# Patient Record
Sex: FEMALE | Race: BLACK OR AFRICAN AMERICAN | NOT HISPANIC OR LATINO | Employment: UNEMPLOYED | URBAN - METROPOLITAN AREA
[De-identification: names, ages, dates, MRNs, and addresses within clinical notes are randomized per-mention and may not be internally consistent; named-entity substitution may affect disease eponyms.]

---

## 2023-10-14 ENCOUNTER — HOSPITAL ENCOUNTER (EMERGENCY)
Facility: HOSPITAL | Age: 33
Discharge: HOME | End: 2023-10-14
Payer: COMMERCIAL

## 2023-10-14 VITALS
WEIGHT: 205 LBS | HEIGHT: 65 IN | DIASTOLIC BLOOD PRESSURE: 82 MMHG | BODY MASS INDEX: 34.16 KG/M2 | TEMPERATURE: 97.5 F | RESPIRATION RATE: 18 BRPM | SYSTOLIC BLOOD PRESSURE: 117 MMHG | HEART RATE: 89 BPM | OXYGEN SATURATION: 99 %

## 2023-10-14 PROCEDURE — 99283 EMERGENCY DEPT VISIT LOW MDM: CPT

## 2023-10-14 PROCEDURE — 4500999001 HC ED NO CHARGE

## 2023-10-14 ASSESSMENT — COLUMBIA-SUICIDE SEVERITY RATING SCALE - C-SSRS
2. HAVE YOU ACTUALLY HAD ANY THOUGHTS OF KILLING YOURSELF?: NO
1. IN THE PAST MONTH, HAVE YOU WISHED YOU WERE DEAD OR WISHED YOU COULD GO TO SLEEP AND NOT WAKE UP?: NO
6. HAVE YOU EVER DONE ANYTHING, STARTED TO DO ANYTHING, OR PREPARED TO DO ANYTHING TO END YOUR LIFE?: NO
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
## 2023-10-14 NOTE — ED NOTES
Pt brought back to [facility]. Pt stated that she was brought here by [facility] and really just wanted her VS checked. Pt doesn't want to see a provider. Pt educated on possible head concussion. Pt ambulated out of the ED with no complaints.      Damion Garber RN  10/14/23 [id number]

## 2023-12-17 ENCOUNTER — HOSPITAL ENCOUNTER (EMERGENCY)
Facility: HOSPITAL | Age: 33
Discharge: HOME | End: 2023-12-18
Payer: COMMERCIAL

## 2023-12-17 VITALS
OXYGEN SATURATION: 99 % | DIASTOLIC BLOOD PRESSURE: 76 MMHG | SYSTOLIC BLOOD PRESSURE: 126 MMHG | HEART RATE: 84 BPM | RESPIRATION RATE: 20 BRPM | TEMPERATURE: 98.1 F

## 2023-12-17 DIAGNOSIS — K04.7 DENTAL ABSCESS: Primary | ICD-10-CM

## 2023-12-17 PROCEDURE — 99284 EMERGENCY DEPT VISIT MOD MDM: CPT | Performed by: PHYSICIAN ASSISTANT

## 2023-12-17 PROCEDURE — 99283 EMERGENCY DEPT VISIT LOW MDM: CPT

## 2023-12-17 PROCEDURE — 2500000001 HC RX 250 WO HCPCS SELF ADMINISTERED DRUGS (ALT 637 FOR MEDICARE OP): Mod: SE | Performed by: PHYSICIAN ASSISTANT

## 2023-12-17 PROCEDURE — 10060 I&D ABSCESS SIMPLE/SINGLE: CPT | Performed by: PHYSICIAN ASSISTANT

## 2023-12-17 PROCEDURE — 41800 DRAINAGE OF GUM LESION: CPT | Performed by: PHYSICIAN ASSISTANT

## 2023-12-17 RX ORDER — AMOXICILLIN AND CLAVULANATE POTASSIUM 875; 125 MG/1; MG/1
1 TABLET, FILM COATED ORAL ONCE
Status: COMPLETED | OUTPATIENT
Start: 2023-12-17 | End: 2023-12-17

## 2023-12-17 RX ORDER — IBUPROFEN 600 MG/1
600 TABLET ORAL EVERY 6 HOURS PRN
Qty: 20 TABLET | Refills: 0 | Status: SHIPPED | OUTPATIENT
Start: 2023-12-17 | End: 2023-12-22

## 2023-12-17 RX ORDER — ACETAMINOPHEN 325 MG/1
650 TABLET ORAL EVERY 6 HOURS PRN
Qty: 20 TABLET | Refills: 0 | Status: SHIPPED | OUTPATIENT
Start: 2023-12-17 | End: 2023-12-22

## 2023-12-17 RX ORDER — AMOXICILLIN AND CLAVULANATE POTASSIUM 875; 125 MG/1; MG/1
1 TABLET, FILM COATED ORAL 2 TIMES DAILY
Qty: 14 TABLET | Refills: 0 | Status: SHIPPED | OUTPATIENT
Start: 2023-12-17 | End: 2023-12-24

## 2023-12-17 RX ORDER — ACETAMINOPHEN 325 MG/1
975 TABLET ORAL ONCE
Status: COMPLETED | OUTPATIENT
Start: 2023-12-17 | End: 2023-12-17

## 2023-12-17 RX ORDER — OXYCODONE AND ACETAMINOPHEN 5; 325 MG/1; MG/1
1 TABLET ORAL EVERY 6 HOURS PRN
Qty: 8 TABLET | Refills: 0 | Status: SHIPPED | OUTPATIENT
Start: 2023-12-17 | End: 2023-12-19

## 2023-12-17 RX ADMIN — AMOXICILLIN AND CLAVULANATE POTASSIUM 875 MG: 875; 125 TABLET, FILM COATED ORAL at 23:33

## 2023-12-17 RX ADMIN — ACETAMINOPHEN 975 MG: 325 TABLET ORAL at 23:33

## 2023-12-17 NOTE — Clinical Note
Parvez Russell was seen and treated in our emergency department on 12/17/2023.  She may return to work on 12/19/2023.       If you have any questions or concerns, please don't hesitate to call.      Evangelina Arcos PA-C

## 2023-12-18 NOTE — ED PROVIDER NOTES
This is a 33-year-old healthy female who presents to the ED with dental pain and concern for a dental abscess.  She states that approximately 2 weeks ago she accidentally hit her front tooth on her car and the tooth has been loose ever since then.  She states over the past 2 to 3 days she has had increasing pain and is noticed an area of swelling superior to this tooth in the gingiva.  She denies any purulent drainage.  She states that earlier today she did feel as if she tasted blood in her mouth.  She endorses generalized malaise as well as subjective fevers and chills.  She denies this ever happening previously.  She does not currently have a dentist and states that she been having difficulty getting into a dentist due to the time of year.      History provided by:  Patient   used: No             Visit Vitals  /76   Pulse 84   Temp 36.7 °C (98.1 °F) (Temporal)   Resp 20   SpO2 99%   Smoking Status Unknown          Physical Exam     Physical exam:   Gen.: Vitals noted no distress afebrile. Normal phonation, no stridor, no trismus.   Eyes: PERRL. EOMI. no scleral icterus. Eye lids without lesions.   ENT: Posterior oropharynx without erythema, exudate, or swelling. Uvula is in the midline and nonedematous. No Lalo's Angina.  Poor dentition throughout with multiple dental caries present.  Area of fluctuance and tenderness to the gingiva superior to tooth #7 that is exquisitely tender to touch.  No purulent drainage.  No open wounds.  The area of fluctuance is approximately 1 x 1 cm in size.  Hearing grossly intact. Mastoids nontender.   Neck: Supple. No meningismus through full range of motion. No lymphadenopathy.   Cardiac: Regular rate rhythm. No murmurs, gallops, rubs  Lungs: Good aeration throughout. No adventitious breath sounds. No wheezes, rhonchi, rales  Extremities: No peripheral edema. Full range of motion. Moves all extremities freely.   Skin: No rash. Warm and dry  Neuro: No  focal neurologic deficits. CN 2-12 grossly intact        Labs Reviewed - No data to display    No orders to display         ED Course & MDM     Medical Decision Making  This is a 33-year-old female who presents to the ED with dental pain.  Vital stable upon arrival to the ED.  On examination she did have tender to palpation over tooth #7 and superior to this tooth she did have an area of fluctuance and tenderness consistent with a dental abscess.  No other areas of fluctuance within the oropharynx.  Normal phonation.  Tolerating own secretions.  No evidence of Lalo angina.  Patient was given Tylenol and Augmentin for this.  The area was anesthetized with Cetacaine spray.  Needle aspiration and drainage of this abscess was performed.  The patient tolerated the procedure well.  She did endorse some improvement of her symptoms following the procedure.  She was advised to follow close with her dentist.  She was given signs and symptoms to return to the ED with.  She was given prescriptions for Percocet, Augmentin, and ibuprofen for her symptoms at home.  OARRS report was checked which showed no recent opiate prescriptions.  She was given to return precautions and was discharged from emergency department in stable condition.    Risk  Prescription drug management.         Diagnoses as of 12/17/23 1098   Dental abscess       Incision and Drainage    Performed by: Evangelina Arcos PA-C  Authorized by: Evangelina Arcos PA-C    Consent:     Consent obtained:  Verbal    Consent given by:  Patient    Risks discussed:  Bleeding, incomplete drainage and pain    Alternatives discussed:  No treatment, alternative treatment and delayed treatment  Universal protocol:     Patient identity confirmed:  Verbally with patient  Location:     Type:  Abscess    Location:  Mouth    Mouth location: Gingiva superior to tooth #7.  Sedation:     Sedation type:  None  Anesthesia:     Anesthesia method:  Topical application    Topical anesthesia:  Cetacaine spray.  Procedure type:     Complexity:  Simple  Procedure details:     Needle aspiration: yes      Needle size:  18 G    Drainage:  Bloody and purulent    Drainage amount:  Moderate    Wound treatment:  Wound left open    Packing materials:  None  Post-procedure details:     Procedure completion:  Tolerated      KERRIE Sousa, JOSE MIGUEL Arcos PA-C  12/17/23 [id number]       Evangelina Arcos PA-C  12/17/23 [id number]

## 2023-12-28 ENCOUNTER — PHARMACY VISIT (OUTPATIENT)
Dept: PHARMACY | Facility: CLINIC | Age: 33
End: 2023-12-28
Payer: MEDICAID

## 2023-12-28 ENCOUNTER — INITIAL PRENATAL (OUTPATIENT)
Dept: OBSTETRICS AND GYNECOLOGY | Facility: CLINIC | Age: 33
End: 2023-12-28
Payer: COMMERCIAL

## 2023-12-28 VITALS — SYSTOLIC BLOOD PRESSURE: 107 MMHG | WEIGHT: 220.7 LBS | DIASTOLIC BLOOD PRESSURE: 71 MMHG | BODY MASS INDEX: 36.73 KG/M2

## 2023-12-28 DIAGNOSIS — Z32.01 PREGNANCY TEST POSITIVE (HHS-HCC): ICD-10-CM

## 2023-12-28 DIAGNOSIS — Z23 ENCOUNTER FOR IMMUNIZATION: Primary | ICD-10-CM

## 2023-12-28 DIAGNOSIS — O36.80X1 PREGNANCY WITH INCONCLUSIVE FETAL VIABILITY, FETUS 1 OF MULTIPLE GESTATION (HHS-HCC): ICD-10-CM

## 2023-12-28 PROCEDURE — 87800 DETECT AGNT MULT DNA DIREC: CPT | Performed by: OBSTETRICS & GYNECOLOGY

## 2023-12-28 PROCEDURE — 90686 IIV4 VACC NO PRSV 0.5 ML IM: CPT | Performed by: OBSTETRICS & GYNECOLOGY

## 2023-12-28 PROCEDURE — 36415 COLL VENOUS BLD VENIPUNCTURE: CPT | Performed by: OBSTETRICS & GYNECOLOGY

## 2023-12-28 PROCEDURE — RXMED WILLOW AMBULATORY MEDICATION CHARGE

## 2023-12-28 PROCEDURE — 99203 OFFICE O/P NEW LOW 30 MIN: CPT | Performed by: OBSTETRICS & GYNECOLOGY

## 2023-12-28 PROCEDURE — 99213 OFFICE O/P EST LOW 20 MIN: CPT | Performed by: OBSTETRICS & GYNECOLOGY

## 2023-12-28 PROCEDURE — 87086 URINE CULTURE/COLONY COUNT: CPT | Performed by: OBSTETRICS & GYNECOLOGY

## 2023-12-28 RX ORDER — NAPROXEN SODIUM 220 MG/1
81 TABLET, FILM COATED ORAL DAILY
Qty: 30 TABLET | Refills: 11 | Status: SHIPPED | OUTPATIENT
Start: 2023-12-28 | End: 2024-01-25 | Stop reason: SDUPTHER

## 2023-12-28 NOTE — PROGRESS NOTES
Subjective   Patient ID [id number]   Parvez Russell is a 33 y.o.  at 10w4d with a working estimated date of delivery of 2024, by Last Menstrual Period who presents for an initial prenatal visit. This pregnancy is unplanned.    Her pregnancy is complicated by:  Grand mulitiparity v    OB History    Para Term  AB Living   6 5 5     4   SAB IAB Ectopic Multiple Live Births           3      # Outcome Date GA Lbr Sean/2nd Weight Sex Delivery Anes PTL Lv   6 Current            5 Term 01/10/20    M Vag-Spont      4 Term 17    M Vag-Spont  N CYNDY   3 Term 14    M Vag-Spont  N CYNDY   2 Term 13    M Vag-Spont  N    1 Term 10/18/06    M Vag-Spont   CYNDY          Objective   Physical Exam  Weight: 100 kg (220 lb 11.2 oz)  Expected Total Weight Gain: 5 kg (11 lb)-9 kg (19 lb)   Pregravid BMI: 36.11  BP: 107/71          Physical Exam  Constitutional:       Appearance: She is obese.   Pulmonary:      Effort: Pulmonary effort is normal.   Psychiatric:         Attention and Perception: Attention normal.         Mood and Affect: Mood normal.         Behavior: Behavior normal.         Prenatal Labs  Today     Assessment/Plan   Diagnoses and all orders for this visit:  Pregnancy test positive  -     CBC Anemia Panel With Reflex,Pregnancy; Future  -     Type And Screen; Future  -     C. Trachomatis / N. Gonorrhoeae, Amplified Detection; Future  -     Syphilis Screen with Reflex; Future  -     Rubella Antibody, IgG; Future  -     Hepatitis C Antibody; Future  -     Urine Culture; Future  -     Hepatitis B Surface Antigen; Future  -     HIV 1/2 Antigen/Antibody Screen with Reflex to Confirmation; Future  -     Hemoglobin A1C; Future  -     Varicella Zoster Antibody, IgG; Future  -     [facility] imaging order; Future  -     prenatal vit,dania 74-iron-folic 27 mg iron- 1 mg tablet; Take 1 tablet by mouth once daily.  -     aspirin 81 mg chewable tablet; Chew 1 tablet (81 mg) once  daily.  Pregnancy with inconclusive fetal viability, fetus 1 of multiple gestation  Other orders  -     Flu vaccine, quadrivalent, high-dose, preservative free, age 65y+ (FLUZONE); Future      Immunizations: flu shot today   Prenatal Labs ordered  Daily prenatal vitamins prescribed  First trimester screening and second trimester screening discussed. Patient decided to NIPS after dating ultrasound   Follow up in 4 weeks for return OB visit.

## 2023-12-29 LAB
BACTERIA UR CULT: NORMAL
C TRACH RRNA SPEC QL NAA+PROBE: NEGATIVE
N GONORRHOEA DNA SPEC QL PROBE+SIG AMP: NEGATIVE

## 2023-12-29 ASSESSMENT — EDINBURGH POSTNATAL DEPRESSION SCALE (EPDS)
I HAVE FELT SCARED OR PANICKY FOR NO GOOD REASON: NO, NOT MUCH
THINGS HAVE BEEN GETTING ON TOP OF ME: NO, MOST OF THE TIME I HAVE COPED QUITE WELL
I HAVE BEEN SO UNHAPPY THAT I HAVE HAD DIFFICULTY SLEEPING: NOT VERY OFTEN
I HAVE BLAMED MYSELF UNNECESSARILY WHEN THINGS WENT WRONG: NOT VERY OFTEN
I HAVE LOOKED FORWARD WITH ENJOYMENT TO THINGS: AS MUCH AS I EVER DID
THE THOUGHT OF HARMING MYSELF HAS OCCURRED TO ME: NEVER
TOTAL SCORE: 7
I HAVE BEEN SO UNHAPPY THAT I HAVE BEEN CRYING: NO, NEVER
I HAVE BEEN ABLE TO LAUGH AND SEE THE FUNNY SIDE OF THINGS: AS MUCH AS I ALWAYS COULD
I HAVE FELT SAD OR MISERABLE: NOT VERY OFTEN
I HAVE BEEN ANXIOUS OR WORRIED FOR NO GOOD REASON: YES, SOMETIMES

## 2024-01-02 ENCOUNTER — ANCILLARY PROCEDURE (OUTPATIENT)
Dept: RADIOLOGY | Facility: CLINIC | Age: 34
End: 2024-01-02
Payer: COMMERCIAL

## 2024-01-02 DIAGNOSIS — Z32.01 PREGNANCY TEST POSITIVE (HHS-HCC): ICD-10-CM

## 2024-01-02 PROCEDURE — 76817 TRANSVAGINAL US OBSTETRIC: CPT

## 2024-01-02 PROCEDURE — 76801 OB US < 14 WKS SINGLE FETUS: CPT | Performed by: OBSTETRICS & GYNECOLOGY

## 2024-01-02 PROCEDURE — 76801 OB US < 14 WKS SINGLE FETUS: CPT

## 2024-01-02 PROCEDURE — 76817 TRANSVAGINAL US OBSTETRIC: CPT | Performed by: OBSTETRICS & GYNECOLOGY

## 2024-01-03 ENCOUNTER — LAB (OUTPATIENT)
Dept: LAB | Facility: LAB | Age: 34
End: 2024-01-03
Payer: COMMERCIAL

## 2024-01-03 DIAGNOSIS — Z32.01 PREGNANCY TEST POSITIVE (HHS-HCC): ICD-10-CM

## 2024-01-03 LAB
ABO GROUP (TYPE) IN BLOOD: NORMAL
ANTIBODY SCREEN: NORMAL
ERYTHROCYTE [DISTWIDTH] IN BLOOD BY AUTOMATED COUNT: 14 % (ref 11.5–14.5)
EST. AVERAGE GLUCOSE BLD GHB EST-MCNC: 103 MG/DL
HBA1C MFR BLD: 5.2 %
HBV SURFACE AG SERPL QL IA: NONREACTIVE
HCT VFR BLD AUTO: 38.2 % (ref 36–46)
HCV AB SER QL: NONREACTIVE
HGB BLD-MCNC: 12.2 G/DL (ref 12–16)
HIV 1+2 AB+HIV1 P24 AG SERPL QL IA: NONREACTIVE
MCH RBC QN AUTO: 29.1 PG (ref 26–34)
MCHC RBC AUTO-ENTMCNC: 31.9 G/DL (ref 32–36)
MCV RBC AUTO: 91 FL (ref 80–100)
NRBC BLD-RTO: 0 /100 WBCS (ref 0–0)
PLATELET # BLD AUTO: 200 X10*3/UL (ref 150–450)
RBC # BLD AUTO: 4.19 X10*6/UL (ref 4–5.2)
REFLEX ADDED, ANEMIA PANEL: NORMAL
RH FACTOR (ANTIGEN D): NORMAL
RUBV IGG SERPL IA-ACNC: 1.3 IA
RUBV IGG SERPL QL IA: POSITIVE
T PALLIDUM AB SER QL: NONREACTIVE
WBC # BLD AUTO: 7.5 X10*3/UL (ref 4.4–11.3)

## 2024-01-03 PROCEDURE — 86901 BLOOD TYPING SEROLOGIC RH(D): CPT

## 2024-01-03 PROCEDURE — 87340 HEPATITIS B SURFACE AG IA: CPT

## 2024-01-03 PROCEDURE — 87389 HIV-1 AG W/HIV-1&-2 AB AG IA: CPT

## 2024-01-03 PROCEDURE — 86900 BLOOD TYPING SEROLOGIC ABO: CPT

## 2024-01-03 PROCEDURE — 86317 IMMUNOASSAY INFECTIOUS AGENT: CPT

## 2024-01-03 PROCEDURE — 36415 COLL VENOUS BLD VENIPUNCTURE: CPT

## 2024-01-03 PROCEDURE — 85027 COMPLETE CBC AUTOMATED: CPT

## 2024-01-03 PROCEDURE — 86780 TREPONEMA PALLIDUM: CPT

## 2024-01-03 PROCEDURE — 83036 HEMOGLOBIN GLYCOSYLATED A1C: CPT

## 2024-01-03 PROCEDURE — 86803 HEPATITIS C AB TEST: CPT

## 2024-01-03 PROCEDURE — 86850 RBC ANTIBODY SCREEN: CPT

## 2024-01-09 ENCOUNTER — ANCILLARY PROCEDURE (OUTPATIENT)
Dept: RADIOLOGY | Facility: CLINIC | Age: 34
End: 2024-01-09
Payer: COMMERCIAL

## 2024-01-09 DIAGNOSIS — Z03.74 ENCOUNTER FOR SUSPECTED PROBLEM WITH FETAL GROWTH RULED OUT: ICD-10-CM

## 2024-01-09 PROCEDURE — 76817 TRANSVAGINAL US OBSTETRIC: CPT

## 2024-01-09 PROCEDURE — 76817 TRANSVAGINAL US OBSTETRIC: CPT | Performed by: OBSTETRICS & GYNECOLOGY

## 2024-01-09 PROCEDURE — 76801 OB US < 14 WKS SINGLE FETUS: CPT | Performed by: OBSTETRICS & GYNECOLOGY

## 2024-01-24 DIAGNOSIS — Z32.01 PREGNANCY TEST POSITIVE (HHS-HCC): ICD-10-CM

## 2024-01-25 ENCOUNTER — LAB (OUTPATIENT)
Dept: LAB | Facility: LAB | Age: 34
End: 2024-01-25
Payer: COMMERCIAL

## 2024-01-25 ENCOUNTER — ROUTINE PRENATAL (OUTPATIENT)
Dept: OBSTETRICS AND GYNECOLOGY | Facility: CLINIC | Age: 34
End: 2024-01-25
Payer: COMMERCIAL

## 2024-01-25 VITALS — WEIGHT: 225.6 LBS | BODY MASS INDEX: 37.54 KG/M2 | DIASTOLIC BLOOD PRESSURE: 69 MMHG | SYSTOLIC BLOOD PRESSURE: 101 MMHG

## 2024-01-25 DIAGNOSIS — Z32.01 PREGNANCY TEST POSITIVE (HHS-HCC): ICD-10-CM

## 2024-01-25 DIAGNOSIS — Z34.81 PRENATAL CARE, SUBSEQUENT PREGNANCY IN FIRST TRIMESTER (HHS-HCC): Primary | ICD-10-CM

## 2024-01-25 DIAGNOSIS — Z34.81 PRENATAL CARE, SUBSEQUENT PREGNANCY IN FIRST TRIMESTER (HHS-HCC): ICD-10-CM

## 2024-01-25 DIAGNOSIS — Z3A.09 9 WEEKS GESTATION OF PREGNANCY (HHS-HCC): ICD-10-CM

## 2024-01-25 LAB
T4 FREE SERPL-MCNC: 1.07 NG/DL (ref 0.78–1.48)
TSH SERPL-ACNC: 0.4 MIU/L (ref 0.44–3.98)

## 2024-01-25 PROCEDURE — 99213 OFFICE O/P EST LOW 20 MIN: CPT | Mod: TH | Performed by: ADVANCED PRACTICE MIDWIFE

## 2024-01-25 PROCEDURE — 99213 OFFICE O/P EST LOW 20 MIN: CPT | Performed by: ADVANCED PRACTICE MIDWIFE

## 2024-01-25 PROCEDURE — 36415 COLL VENOUS BLD VENIPUNCTURE: CPT

## 2024-01-25 PROCEDURE — 84439 ASSAY OF FREE THYROXINE: CPT

## 2024-01-25 PROCEDURE — 84443 ASSAY THYROID STIM HORMONE: CPT

## 2024-01-25 RX ORDER — PRENATAL WITH FERROUS FUM AND FOLIC ACID 3080; 920; 120; 400; 22; 1.84; 3; 20; 10; 1; 12; 200; 27; 25; 2 [IU]/1; [IU]/1; MG/1; [IU]/1; MG/1; MG/1; MG/1; MG/1; MG/1; MG/1; UG/1; MG/1; MG/1; MG/1; MG/1
1 TABLET ORAL DAILY
Qty: 30 TABLET | Refills: 0 | Status: SHIPPED | OUTPATIENT
Start: 2024-01-25 | End: 2025-01-24

## 2024-01-25 RX ORDER — NAPROXEN SODIUM 220 MG/1
162 TABLET, FILM COATED ORAL DAILY
Qty: 60 TABLET | Refills: 11 | Status: SHIPPED | OUTPATIENT
Start: 2024-01-25 | End: 2025-01-24

## 2024-01-25 RX ORDER — PRENATAL WITH FERROUS FUM AND FOLIC ACID 3080; 920; 120; 400; 22; 1.84; 3; 20; 10; 1; 12; 200; 27; 25; 2 [IU]/1; [IU]/1; MG/1; [IU]/1; MG/1; MG/1; MG/1; MG/1; MG/1; MG/1; UG/1; MG/1; MG/1; MG/1; MG/1
1 TABLET ORAL DAILY
Qty: 30 TABLET | Refills: 0 | Status: SHIPPED | OUTPATIENT
Start: 2024-01-25 | End: 2024-01-25 | Stop reason: SDUPTHER

## 2024-01-25 NOTE — PROGRESS NOTES
Subjective   Parvez Russell is a 33 y.o.  at 9w3d with a working estimated date of delivery of 2024, by Ultrasound who presents for a routine prenatal visit.     She denies vaginal bleeding, abdominal pain, leakage of fluid.     Objective   Physical Exam  Weight: 102 kg (225 lb 9.6 oz), Pregravid BMI: 36.11  Expected Total Weight Gain: 5 kg (11 lb)-9 kg (19 lb)   BP: 101/69 (HR 87)         Problem List Items Addressed This Visit       Prenatal care, subsequent pregnancy in first trimester - Primary    Relevant Orders    [facility] Prequel Prenatal Screen    TSH with reflex to Free T4 if abnormal    Follow Up In Obstetrics    9 weeks gestation of pregnancy     Other Visit Diagnoses       Pregnancy test positive        Relevant Medications    prenatal vitamin calcium-iron-folic (Prenatal Vitamin Plus Low Iron) 27 mg iron- 1 mg tablet    aspirin 81 mg chewable tablet            -Start bASA for PEC prophylaxis - discussed starting @ 12 weeks   Patient enrolled in Centering Pregnancy  NIPT kit given today   -[facility] scheduled   -Reviewed reasons to call CNM on-call: vaginal bleeding, loss of fluid, severe pelvic pain, or any questions/concerns  -RTC in 4 weeks or prn      DOUGLAS Monahan-MARCO, APRN-CNP

## 2024-02-05 ENCOUNTER — TELEPHONE (OUTPATIENT)
Dept: OBSTETRICS AND GYNECOLOGY | Facility: CLINIC | Age: 34
End: 2024-02-05
Payer: COMMERCIAL

## 2024-02-05 NOTE — TELEPHONE ENCOUNTER
Pt returned call to office Discussed if wanting Genetic testing the lab needs to be repeated. Has NT ultrasound on 2/19/24 she will decide if she will repeat testing then.

## 2024-02-19 ENCOUNTER — APPOINTMENT (OUTPATIENT)
Dept: LAB | Facility: LAB | Age: 34
End: 2024-02-19
Payer: COMMERCIAL

## 2024-02-19 ENCOUNTER — LAB (OUTPATIENT)
Dept: LAB | Facility: LAB | Age: 34
End: 2024-02-19
Payer: COMMERCIAL

## 2024-02-19 ENCOUNTER — HOSPITAL ENCOUNTER (OUTPATIENT)
Dept: RADIOLOGY | Facility: CLINIC | Age: 34
Discharge: HOME | End: 2024-02-19
Payer: COMMERCIAL

## 2024-02-19 DIAGNOSIS — Z34.81 PRENATAL CARE, SUBSEQUENT PREGNANCY IN FIRST TRIMESTER (HHS-HCC): ICD-10-CM

## 2024-02-19 DIAGNOSIS — Z36.82 ENCOUNTER FOR ANTENATAL SCREENING FOR NUCHAL TRANSLUCENCY (HHS-HCC): ICD-10-CM

## 2024-02-19 DIAGNOSIS — Z34.81 PRENATAL CARE, SUBSEQUENT PREGNANCY IN FIRST TRIMESTER (HHS-HCC): Primary | ICD-10-CM

## 2024-02-19 PROCEDURE — 76816 OB US FOLLOW-UP PER FETUS: CPT | Performed by: MEDICAL GENETICS

## 2024-02-19 PROCEDURE — 76816 OB US FOLLOW-UP PER FETUS: CPT

## 2024-02-27 ENCOUNTER — APPOINTMENT (OUTPATIENT)
Dept: OBSTETRICS AND GYNECOLOGY | Facility: CLINIC | Age: 34
End: 2024-02-27
Payer: COMMERCIAL

## 2024-02-27 ENCOUNTER — TELEPHONE (OUTPATIENT)
Dept: OBSTETRICS AND GYNECOLOGY | Facility: CLINIC | Age: 34
End: 2024-02-27
Payer: COMMERCIAL

## 2024-02-27 LAB — SCAN RESULT: NORMAL

## 2024-02-27 NOTE — TELEPHONE ENCOUNTER
----- Message from Kim Davis RN sent at 2/26/2024  2:26 PM EST -----  Regarding: Prequel  Watch for Prequel results

## 2024-02-29 ENCOUNTER — ROUTINE PRENATAL (OUTPATIENT)
Dept: OBSTETRICS AND GYNECOLOGY | Facility: CLINIC | Age: 34
End: 2024-02-29
Payer: COMMERCIAL

## 2024-02-29 VITALS — BODY MASS INDEX: 37.77 KG/M2 | SYSTOLIC BLOOD PRESSURE: 90 MMHG | WEIGHT: 227 LBS | DIASTOLIC BLOOD PRESSURE: 64 MMHG

## 2024-02-29 DIAGNOSIS — Z3A.09 9 WEEKS GESTATION OF PREGNANCY (HHS-HCC): ICD-10-CM

## 2024-02-29 DIAGNOSIS — E66.9 CLASS II OBESITY: Primary | ICD-10-CM

## 2024-02-29 DIAGNOSIS — Z34.81 PRENATAL CARE, SUBSEQUENT PREGNANCY IN FIRST TRIMESTER (HHS-HCC): ICD-10-CM

## 2024-02-29 PROBLEM — E66.812 CLASS II OBESITY: Status: ACTIVE | Noted: 2024-02-29

## 2024-02-29 LAB — SCAN RESULT: NORMAL

## 2024-02-29 PROCEDURE — 99213 OFFICE O/P EST LOW 20 MIN: CPT | Performed by: ADVANCED PRACTICE MIDWIFE

## 2024-02-29 PROCEDURE — H1002 CARECOORDINATION PRENATAL: HCPCS | Performed by: ADVANCED PRACTICE MIDWIFE

## 2024-02-29 PROCEDURE — S9452 NUTRITION CLASS: HCPCS | Performed by: ADVANCED PRACTICE MIDWIFE

## 2024-02-29 PROCEDURE — 99078 GROUP HEALTH EDUCATION: CPT | Performed by: ADVANCED PRACTICE MIDWIFE

## 2024-02-29 NOTE — PROGRESS NOTES
Subjective   Parvez Russell is a 33 y.o.  at 14w3d with a working estimated date of delivery of 2024, by Ultrasound who presents for Centering visit #1.     1:1 Visit:   She denies vaginal bleeding, abdominal pain, or leakage of fluid.   Starting to feel fetal movement  Very excited - it's a girl! Has 5 boys at home   Taking bASA  Desires tubal ligation postpartum     Objective   Physical Exam  Weight: 103 kg (227 lb), Pregravid BMI: 36.11  Expected Total Weight Gain: 5 kg (11 lb)-9 kg (19 lb)   BP: 90/64  Fetal Heart Rate: 155      Problem List Items Addressed This Visit       Prenatal care, subsequent pregnancy in first trimester    9 weeks gestation of pregnancy    Class II obesity - Primary    Overview     BMI = 36.11 at NOB  Fetal surveillance BMI 35-39.9 at NOB:  Growth US at 30 and 36 wks  BPP or NST weekly 37 wks to delivery    Fetal surveillance BMI >40 at NOB:  Growth US at 30 and 36 wks  BPP or NST weekly 34 wks to delivery    Timing of delivery: 39 0/7 - 39 6/7 wks  *BMI >= 50 at any time in pregnancy: Deliver at Level 4              Centering Session #1 Plan:   -Reviewed NIPT results  Start bASA for PEC prophylaxis  -Anatomy US ordered  -The following educational material was reviewed:  Group expectations/guidelines/confidentiality form  BMI; IOM recommendations for weight gain in pregnancy based on starting BMI  Nutrition including dietary restrictions, serving sizes  Healthy lifestyle choices   -Reviewed reasons to call: heavy vaginal bleeding, loss of fluid, strong pelvic pain, any questions/concerns  -RTC for next Centering visit in 4 weeks       1:1 total time 10min  Centering Visit total time 120min    Emma Duarte, DOUGLAS-CNM, APRN-CNP

## 2024-03-04 ENCOUNTER — TELEPHONE (OUTPATIENT)
Dept: OBSTETRICS AND GYNECOLOGY | Facility: CLINIC | Age: 34
End: 2024-03-04
Payer: COMMERCIAL

## 2024-03-04 NOTE — TELEPHONE ENCOUNTER
Pt calling having slight pink spotting when she wipes. Pt denies having sex recently or having vaginal exam. Pt having very  slight cramping but no pain. Discussed with Dr Nam. Offered pt appointment for tomorrow morning- pt unable to come- offered several other appointments and can't come until Friday. Pt given bleeding precautions.

## 2024-03-08 ENCOUNTER — ROUTINE PRENATAL (OUTPATIENT)
Dept: OBSTETRICS AND GYNECOLOGY | Facility: CLINIC | Age: 34
End: 2024-03-08
Payer: COMMERCIAL

## 2024-03-08 VITALS
SYSTOLIC BLOOD PRESSURE: 103 MMHG | BODY MASS INDEX: 38.27 KG/M2 | DIASTOLIC BLOOD PRESSURE: 72 MMHG | HEART RATE: 90 BPM | WEIGHT: 230 LBS

## 2024-03-08 DIAGNOSIS — Z3A.15 15 WEEKS GESTATION OF PREGNANCY (HHS-HCC): ICD-10-CM

## 2024-03-08 DIAGNOSIS — O46.92 VAGINAL BLEEDING IN PREGNANCY, SECOND TRIMESTER (HHS-HCC): Primary | ICD-10-CM

## 2024-03-08 DIAGNOSIS — Z34.81 PRENATAL CARE, SUBSEQUENT PREGNANCY IN FIRST TRIMESTER (HHS-HCC): ICD-10-CM

## 2024-03-08 PROCEDURE — 99214 OFFICE O/P EST MOD 30 MIN: CPT | Mod: GC,TH

## 2024-03-08 PROCEDURE — 99214 OFFICE O/P EST MOD 30 MIN: CPT

## 2024-03-08 ASSESSMENT — PAIN - FUNCTIONAL ASSESSMENT: PAIN_FUNCTIONAL_ASSESSMENT: 0-10

## 2024-03-08 ASSESSMENT — PAIN SCALES - GENERAL: PAINLEVEL_OUTOF10: 0 - NO PAIN

## 2024-03-08 NOTE — PROGRESS NOTES
Subjective   Patient ID [id number]   Parvez Russell is a 33 y.o.  at 15w4d with a working estimated date of delivery of 2024, by Ultrasound who presents for a routine prenatal visit. She denies vaginal bleeding, leakage of fluid, decreased fetal movements, and contractions.  She reports spotting since 3/2, the amount is small just when wiping, dark colored and she feels some cramping when it started. She has not had any spotting or cramping these last 2 days.  Her pregnancy is complicated by:  BMI 38, on bASA    Objective   Physical Exam  Weight: 104 kg (230 lb)  Expected Total Weight Gain: 5 kg (11 lb)-9 kg (19 lb)   Pregravid BMI: 36.11  BP: 103/72       BSUS: , cevical length 37 mm    The most recent ultrasound study is not finalized  The most recent ultrasound study is not finalized    Assessment/Plan   Problem List Items Addressed This Visit             ICD-10-CM    Prenatal care, subsequent pregnancy in first trimester Z34.81    Relevant Orders    Referral to Nutrition Services    15 weeks gestation of pregnancy Z3A.15     PNLs reviewed wnl  Referral to nutrition for accelerated gain of weight         Vaginal bleeding in pregnancy, second trimester - Primary O46.92    Relevant Orders    [facility] OB imaging order  BSUS:  Cervical length: 37 mm       Continue prenatal vitamin.  Labs reviewed.  Type and screen: O positive Rhogam not necessary.    Follow up in 3 weeks for a routine prenatal visit.  [facility] order at first available chance  Return precautions discussed in case of bleeding or pain and pressure.    Seen and evaluated with Dr. Sarah Fabian MD PGY1

## 2024-03-08 NOTE — PROGRESS NOTES
I saw and evaluated the patient. I personally obtained the key and critical portions of the history and physical exam or was physically present for key and critical portions performed by the resident/fellow. I reviewed the resident/fellow's documentation and discussed the patient with the resident/fellow. I agree with the resident/fellow's medical decision making as documented in the note.    Carolina Smith MD

## 2024-03-12 ENCOUNTER — HOSPITAL ENCOUNTER (OUTPATIENT)
Dept: RADIOLOGY | Facility: CLINIC | Age: 34
Discharge: HOME | End: 2024-03-12
Payer: COMMERCIAL

## 2024-03-12 DIAGNOSIS — Z36.2 ENCOUNTER FOR OTHER ANTENATAL SCREENING FOLLOW-UP (HHS-HCC): ICD-10-CM

## 2024-03-12 PROCEDURE — 76816 OB US FOLLOW-UP PER FETUS: CPT | Performed by: OBSTETRICS & GYNECOLOGY

## 2024-03-12 PROCEDURE — 76816 OB US FOLLOW-UP PER FETUS: CPT

## 2024-03-15 ENCOUNTER — TELEPHONE (OUTPATIENT)
Dept: OBSTETRICS AND GYNECOLOGY | Facility: CLINIC | Age: 34
End: 2024-03-15
Payer: COMMERCIAL

## 2024-03-15 NOTE — TELEPHONE ENCOUNTER
farideh Kong RN  [id number] Clinical Support Staff  Phone Number: [phone]     16 weeks preg -  - States had spotting 3 different episodes over the past two weeks.  Guidelines to have seen in the next 3 days. Went over complicating s/s and when to seek higher level of care. Please call to discuss.   [phone]          Comments    Called pt spotting has resolved Had ultrasound completed 3/12/24 for bleeding no indication for bleeding. Instructed pt to go to ED/L&D for pain, increased bleeding. Call and speak with provider on call if spotting returns. Encouraged nothing in the vagina.

## 2024-03-18 ENCOUNTER — HOSPITAL ENCOUNTER (OUTPATIENT)
Facility: HOSPITAL | Age: 34
End: 2024-03-18
Admitting: ADVANCED PRACTICE MIDWIFE
Payer: COMMERCIAL

## 2024-03-18 ENCOUNTER — HOSPITAL ENCOUNTER (OUTPATIENT)
Facility: HOSPITAL | Age: 34
Discharge: HOME | End: 2024-03-18
Attending: OBSTETRICS & GYNECOLOGY | Admitting: ADVANCED PRACTICE MIDWIFE
Payer: COMMERCIAL

## 2024-03-18 ENCOUNTER — TELEPHONE (OUTPATIENT)
Dept: OBSTETRICS AND GYNECOLOGY | Facility: CLINIC | Age: 34
End: 2024-03-18
Payer: COMMERCIAL

## 2024-03-18 VITALS
HEART RATE: 85 BPM | WEIGHT: 231.04 LBS | OXYGEN SATURATION: 99 % | TEMPERATURE: 97 F | DIASTOLIC BLOOD PRESSURE: 72 MMHG | BODY MASS INDEX: 37.13 KG/M2 | HEIGHT: 66 IN | SYSTOLIC BLOOD PRESSURE: 112 MMHG | RESPIRATION RATE: 18 BRPM

## 2024-03-18 LAB
APTT PPP: 30 SECONDS (ref 27–38)
CLUE CELLS SPEC QL WET PREP: NORMAL
ERYTHROCYTE [DISTWIDTH] IN BLOOD BY AUTOMATED COUNT: 13.3 % (ref 11.5–14.5)
FIBRINOGEN PPP-MCNC: 470 MG/DL (ref 200–400)
HCT VFR BLD AUTO: 38.7 % (ref 36–46)
HGB BLD-MCNC: 12 G/DL (ref 12–16)
INR PPP: 0.9 (ref 0.9–1.1)
MCH RBC QN AUTO: 28 PG (ref 26–34)
MCHC RBC AUTO-ENTMCNC: 31 G/DL (ref 32–36)
MCV RBC AUTO: 90 FL (ref 80–100)
NRBC BLD-RTO: 0 /100 WBCS (ref 0–0)
PLATELET # BLD AUTO: 187 X10*3/UL (ref 150–450)
POC APPEARANCE, URINE: CLEAR
POC BILIRUBIN, URINE: NEGATIVE
POC BLOOD, URINE: ABNORMAL
POC COLOR, URINE: YELLOW
POC GLUCOSE, URINE: NEGATIVE MG/DL
POC KETONES, URINE: NEGATIVE MG/DL
POC LEUKOCYTES, URINE: NEGATIVE
POC NITRITE,URINE: NEGATIVE
POC PH, URINE: 6 PH
POC PROTEIN, URINE: NEGATIVE MG/DL
POC SPECIFIC GRAVITY, URINE: >=1.03
POC UROBILINOGEN, URINE: 0.2 EU/DL
PROTHROMBIN TIME: 10.1 SECONDS (ref 9.8–12.8)
RBC # BLD AUTO: 4.28 X10*6/UL (ref 4–5.2)
T VAGINALIS SPEC QL WET PREP: NORMAL
TRICHOMONAS REFLEX COMMENT: NORMAL
TSH SERPL-ACNC: 1.23 MIU/L (ref 0.44–3.98)
WBC # BLD AUTO: 8.3 X10*3/UL (ref 4.4–11.3)
WBC VAG QL WET PREP: NORMAL
YEAST VAG QL WET PREP: NORMAL

## 2024-03-18 PROCEDURE — 36415 COLL VENOUS BLD VENIPUNCTURE: CPT

## 2024-03-18 PROCEDURE — 87210 SMEAR WET MOUNT SALINE/INK: CPT | Performed by: OBSTETRICS & GYNECOLOGY

## 2024-03-18 PROCEDURE — 99214 OFFICE O/P EST MOD 30 MIN: CPT

## 2024-03-18 PROCEDURE — 36415 COLL VENOUS BLD VENIPUNCTURE: CPT | Performed by: OBSTETRICS & GYNECOLOGY

## 2024-03-18 PROCEDURE — 99215 OFFICE O/P EST HI 40 MIN: CPT

## 2024-03-18 PROCEDURE — 85610 PROTHROMBIN TIME: CPT | Performed by: OBSTETRICS & GYNECOLOGY

## 2024-03-18 PROCEDURE — 85384 FIBRINOGEN ACTIVITY: CPT | Performed by: OBSTETRICS & GYNECOLOGY

## 2024-03-18 PROCEDURE — 81002 URINALYSIS NONAUTO W/O SCOPE: CPT | Performed by: OBSTETRICS & GYNECOLOGY

## 2024-03-18 PROCEDURE — 85027 COMPLETE CBC AUTOMATED: CPT | Performed by: OBSTETRICS & GYNECOLOGY

## 2024-03-18 PROCEDURE — 84443 ASSAY THYROID STIM HORMONE: CPT | Performed by: OBSTETRICS & GYNECOLOGY

## 2024-03-18 PROCEDURE — 87086 URINE CULTURE/COLONY COUNT: CPT | Performed by: OBSTETRICS & GYNECOLOGY

## 2024-03-18 RX ORDER — ACETAMINOPHEN 325 MG/1
650 TABLET ORAL ONCE
Status: DISCONTINUED | OUTPATIENT
Start: 2024-03-18 | End: 2024-03-18

## 2024-03-18 RX ORDER — ACETAMINOPHEN 325 MG/1
975 TABLET ORAL ONCE
Status: DISCONTINUED | OUTPATIENT
Start: 2024-03-18 | End: 2024-03-18 | Stop reason: HOSPADM

## 2024-03-18 SDOH — HEALTH STABILITY: MENTAL HEALTH: HAVE YOU USED ANY PRESCRIPTION DRUGS OTHER THAN PRESCRIBED IN THE PAST 12 MONTHS?: NO

## 2024-03-18 SDOH — SOCIAL STABILITY: SOCIAL INSECURITY: HAS ANYONE EVER THREATENED TO HURT YOUR FAMILY OR YOUR PETS?: NO

## 2024-03-18 SDOH — HEALTH STABILITY: MENTAL HEALTH: SUICIDAL BEHAVIOR (LIFETIME): NO

## 2024-03-18 SDOH — HEALTH STABILITY: MENTAL HEALTH: WISH TO BE DEAD (PAST 1 MONTH): NO

## 2024-03-18 SDOH — SOCIAL STABILITY: SOCIAL INSECURITY: ARE THERE ANY APPARENT SIGNS OF INJURIES/BEHAVIORS THAT COULD BE RELATED TO ABUSE/NEGLECT?: NO

## 2024-03-18 SDOH — SOCIAL STABILITY: SOCIAL INSECURITY: DO YOU FEEL ANYONE HAS EXPLOITED OR TAKEN ADVANTAGE OF YOU FINANCIALLY OR OF YOUR PERSONAL PROPERTY?: NO

## 2024-03-18 SDOH — HEALTH STABILITY: MENTAL HEALTH: WERE YOU ABLE TO COMPLETE ALL THE BEHAVIORAL HEALTH SCREENINGS?: YES

## 2024-03-18 SDOH — SOCIAL STABILITY: SOCIAL INSECURITY: PHYSICAL ABUSE: DENIES

## 2024-03-18 SDOH — ECONOMIC STABILITY: HOUSING INSECURITY: DO YOU FEEL UNSAFE GOING BACK TO THE PLACE WHERE YOU ARE LIVING?: NO

## 2024-03-18 SDOH — SOCIAL STABILITY: SOCIAL INSECURITY: ARE YOU OR HAVE YOU BEEN THREATENED OR ABUSED PHYSICALLY, EMOTIONALLY, OR SEXUALLY BY ANYONE?: NO

## 2024-03-18 SDOH — SOCIAL STABILITY: SOCIAL INSECURITY: HAVE YOU HAD THOUGHTS OF HARMING ANYONE ELSE?: NO

## 2024-03-18 SDOH — SOCIAL STABILITY: SOCIAL INSECURITY: VERBAL ABUSE: DENIES

## 2024-03-18 SDOH — SOCIAL STABILITY: SOCIAL INSECURITY: ABUSE SCREEN: ADULT

## 2024-03-18 SDOH — SOCIAL STABILITY: SOCIAL INSECURITY: DOES ANYONE TRY TO KEEP YOU FROM HAVING/CONTACTING OTHER FRIENDS OR DOING THINGS OUTSIDE YOUR HOME?: NO

## 2024-03-18 SDOH — HEALTH STABILITY: MENTAL HEALTH: NON-SPECIFIC ACTIVE SUICIDAL THOUGHTS (PAST 1 MONTH): NO

## 2024-03-18 SDOH — HEALTH STABILITY: MENTAL HEALTH: HAVE YOU USED ANY SUBSTANCES (CANABIS, COCAINE, HEROIN, HALLUCINOGENS, INHALANTS, ETC.) IN THE PAST 12 MONTHS?: NO

## 2024-03-18 ASSESSMENT — LIFESTYLE VARIABLES
AUDIT-C TOTAL SCORE: -1
HOW OFTEN DO YOU HAVE 6 OR MORE DRINKS ON ONE OCCASION: PATIENT DECLINED
AUDIT-C TOTAL SCORE: -1
HOW OFTEN DO YOU HAVE A DRINK CONTAINING ALCOHOL: PATIENT DECLINED
SKIP TO QUESTIONS 9-10: 0
HOW MANY STANDARD DRINKS CONTAINING ALCOHOL DO YOU HAVE ON A TYPICAL DAY: PATIENT DECLINED

## 2024-03-18 ASSESSMENT — PATIENT HEALTH QUESTIONNAIRE - PHQ9
1. LITTLE INTEREST OR PLEASURE IN DOING THINGS: NOT AT ALL
SUM OF ALL RESPONSES TO PHQ9 QUESTIONS 1 & 2: 0
2. FEELING DOWN, DEPRESSED OR HOPELESS: NOT AT ALL

## 2024-03-18 ASSESSMENT — PAIN SCALES - GENERAL: PAINLEVEL: 0 - NO PAIN

## 2024-03-18 NOTE — TELEPHONE ENCOUNTER
SIMI Monahan, APRN-CNP  [facility] Clinical Support Staff4 minutes ago (3:07 PM)       Please call patient and advise to go to triage     Charbel Cleveland routed conversation to SIMI Monahan, APRN-CNP3 days ago     Parvez Jefferson Administ[employer] (supporting SIMI Monahan, APRN-CNP)3 days ago     SR  [place] as I were getting ready for work this morning I noticed more bleeding just light pink on my towel again       Called pt instructed to present to L&D per CNM recommendation Pt agrees

## 2024-03-18 NOTE — H&P
Obstetrical Triage History and Physical     Parvez Russell is a 33 y.o.  presents with vaginal spotting @ 17+ wks (last on 3/15/24), no evidence of spotting on exam today    Chief Complaint: Vaginal spotting    Assessment/Plan    Vaginal spotting  - SSE: No blood noted in the vault or from cervix. White discharge noted in the vault  - Three episodes of spotting on 3/2, 3/7, and 3/15  - SVE: 0/0/-3 with no blood noted  - CBC: Hg 12.0, WBC 8.3  - TSH WNL (1.23)  - U/A negative  - Wet prep pending  - Urine culture pending  - Anatomy scan scheduled , OBGYN visit scheduled 3/28  - Likely physiologic spotting. Return precautions addressed.  Patient will check MyChart for wet prep and urine culture results and will also be notified if positive.     Fetal Well Being  - GBS negative  - O+  - Estimated delivery date 2024 by US    Maternal Well Being  - Vital signs stable and within normal limits  - All questions answered and concerns addressed    Dispo: Discharged home in good condition with strict return precautions and follow-up appointments set with OB GYN and anatomy scan. Patient will be notified if urine culture and/or wet prep results as positive. Patient understood and was agreeable with the plan.    Patient seen by and discussed with attending physician, Dr. Abena Rothman, DO  Emergency Medicine PGY1    Active Problems:  There are no active Hospital Problems.      Pregnancy Problems (from 23 to present)       Problem Noted Resolved    Vaginal bleeding in pregnancy, second trimester 3/8/2024 by Bhargavi Fabian MD No    Priority:  Medium      Overview Signed 3/8/2024  4:04 PM by Bhargavi Fabian MD     BSUS: ,, cervical length: 37 mm  Mac imaging ordered         Prenatal care, subsequent pregnancy in first trimester 2024 by SIMI Monahan, APRN-CNP No    Priority:  Medium      15 weeks gestation of pregnancy 2024 by SIMI Monahan, APRN-CNP No     Priority:  Medium      Overview Addendum 3/8/2024  4:06 PM by Bhargavi Fabian MD     Dating:   [x] Initial BMI: 36  [x] Prenatal Labs: wnl  [x] Aneuploidy Screening:  rr cfDNA  [x] Baby ASA: since 12 wks  [x] Anatomy US: scheduled   [] 1hr GCT at 24-28wks:  [] Tdap (27-36wks):  [] Flu Shot:  [] COVID vaccine:   [] Rhogam (if Rh neg):   [] GBS at 36 wks:  [] Breastfeeding  [] PPBC:   [] 39 weeks discussion of IOL vs. Expectant management:  [] Mode of delivery:                 Subjective   Parvez is here complaining of vaginal spotting. Good fetal movement. Denies contractions., Denies leaking of fluid.      Patient is a 32 yo  at 17w0d gestation who presents after three episodes of spotting. Patient states that she noticed blood three times when wiping on 3/2, 3/7, and 3/15, with the heaviest being on 3/7. She denies any blood in the toilet bowl.  She states that she left a message for BRENDA Hyman, who didn't respond until today and suggested she be evaluated at [facility]'s L&D. Patient states that she has had some lower abdominal cramping, which feels like period-like cramps, occasionally. Patient denies any other abdominal pain, new shortness of breath, chest pain, vision changes, tinnitus, headache, nausea or vomiting, or edema. Patient denies recent intercourse. She states that the pain in her lower abdomen sometimes feels like gas pain and relieved when passing gas. She denies pain radiating into her back.     Obstetrical History   OB History    Para Term  AB Living   6 5 5     5   SAB IAB Ectopic Multiple Live Births           5      # Outcome Date GA Lbr Sean/2nd Weight Sex Delivery Anes PTL Lv   6 Current            5 Term 01/10/20    M Vag-Spont   CYNDY   4 Term 17    M Vag-Spont  N CYNDY   3 Term 14    M Vag-Spont  N CYNDY   2 Term 13    M Vag-Spont  N CYNDY   1 Term 10/18/06    M Vag-Spont   CYNDY       Past Medical History  Past Medical History:   Diagnosis Date    13 weeks  gestation of pregnancy 2019    13 weeks gestation of pregnancy    2-part displaced fracture of surgical neck of unspecified humerus, initial encounter for closed fracture 10/29/2015    2-part displaced fracture of surgical neck of humerus    32 weeks gestation of pregnancy 2019    32 weeks gestation of pregnancy    Allergy status to unspecified drugs, medicaments and biological substances     History of seasonal allergies    Anemia complicating pregnancy, first trimester 10/14/2016    Antepartum anemia complicating pregnancy, first trimester    Anemia complicating pregnancy, third trimester 2019    Anemia affecting pregnancy in third trimester    Chlamydial infection, unspecified     Chlamydia    Displaced fracture of shaft of right clavicle, initial encounter for closed fracture 2017    Closed displaced fracture of shaft of right clavicle, initial encounter    Encounter for contraceptive management, unspecified 2015    Encounter for contraceptive management    Encounter for full-term uncomplicated delivery      (spontaneous vaginal delivery)    Encounter for immunization 2019    Need for Tdap vaccination    Encounter for immunization 2016    Need for Tdap vaccination    Encounter for screening for infections with a predominantly sexual mode of transmission 2016    Screen for STD (sexually transmitted disease)    Encounter for screening for malignant neoplasm of cervix 2019    Screening for cervical cancer    Encounter for supervision of normal pregnancy, unspecified, first trimester 2019    First trimester pregnancy    Encounter for supervision of normal pregnancy, unspecified, second trimester 10/22/2019    Second trimester pregnancy    Encounter for supervision of other normal pregnancy, third trimester 2020    Prenatal care, subsequent pregnancy, third trimester    Encounter for supervision of other normal pregnancy, third trimester 2019     Multigravida in third trimester    Encounter for supervision of other normal pregnancy, unspecified trimester 11/05/2019    Prenatal care, subsequent pregnancy    Encounter for supervision of other normal pregnancy, unspecified trimester 07/28/2014    Pregnancy, subsequent    Encounter for supervision of other normal pregnancy, unspecified trimester 09/09/2019    Encounter for supervision of normal pregnancy in multigravida    Encounter for surveillance of injectable contraceptive 08/20/2021    Encounter for Depo-Provera contraception    Headache, unspecified 07/21/2013    Headache    Other conditions influencing health status 05/02/2016    Closed fracture of metacarpophalangeal (MCP) joint of right thumb with ligament tear    Other conditions influencing health status     History of pregnancy    Other problems related to lifestyle 07/12/2016    Other problems related to lifestyle    Pain in unspecified shoulder 10/30/2015    Shoulder pain    Personal history of (healed) traumatic fracture     History of fracture of clavicle    Personal history of diseases of the blood and blood-forming organs and certain disorders involving the immune mechanism 11/01/2016    History of thrombocytopenia    Personal history of diseases of the blood and blood-forming organs and certain disorders involving the immune mechanism     History of anemia    Personal history of nicotine dependence 07/21/2013    History of nicotine dependence    Personal history of other diseases of the female genital tract 07/21/2013    History of vaginitis    Personal history of other diseases of the female genital tract 07/12/2016    History of amenorrhea    Personal history of other diseases of the nervous system and sense organs 07/21/2013    Personal history of otitis media    Personal history of other infectious and parasitic diseases 07/28/2014    History of scabies    Personal history of other infectious and parasitic diseases     History of  candidal vulvovaginitis    Personal history of other infectious and parasitic diseases     History of gonorrhea    Personal history of other specified conditions     History of abnormal Pap smear    Personal history of traumatic brain injury 09/19/2017    History of concussion    Personal history of urinary (tract) infections     Personal history of urinary tract infection        Past Surgical History   Past Surgical History:   Procedure Laterality Date    HAND SURGERY  07/12/2016    Hand Surgery                                                                                                                                                             Social History  Social History     Tobacco Use    Smoking status: Former     Types: Cigars    Smokeless tobacco: Never   Substance Use Topics    Alcohol use: Not Currently     Substance and Sexual Activity   Drug Use Not Currently       Allergies  Morphine     Medications  Medications Prior to Admission   Medication Sig Dispense Refill Last Dose    aspirin 81 mg chewable tablet Chew 2 tablets (162 mg) once daily. 60 tablet 11     prenatal vitamin calcium-iron-folic (Prenatal Vitamin Plus Low Iron) 27 mg iron- 1 mg tablet Take 1 tablet by mouth once daily. 30 tablet 0        Objective    Last Vitals  Temp Pulse Resp BP MAP O2 Sat   36.6 °C (97.9 °F) 93 18 118/63   99 %     Physical Examination  GENERAL: Examination reveals a well developed, well nourished, gravid female in no acute distress. She is alert and cooperative.  LUNGS: clear to auscultation bilaterally  HEART: regular rate and rhythm, S1, S2 normal, no murmur, click, rub or gallop  ABDOMEN: soft, gravid, nontender, nondistended, no abnormal masses, no epigastric pain  VAGINA: normal appearing vagina with normal color and no lesions noted. A few mm of white discharge noted in vault  CERVIX: 0 cm dilated, 0 % effaced, -3 station and evaluated by sterile speculum exam; MEMBRANES are in tact   NEUROLOGICAL: DTRs  normal and symmetrical  PSYCHOLOGICAL: awake and alert; oriented to person, place, and time    Lab Review  Labs in chart were reviewed.

## 2024-03-20 DIAGNOSIS — Z3A.17 17 WEEKS GESTATION OF PREGNANCY (HHS-HCC): ICD-10-CM

## 2024-03-20 DIAGNOSIS — O23.42 URINARY TRACT INFECTION IN MOTHER DURING SECOND TRIMESTER OF PREGNANCY (HHS-HCC): Primary | ICD-10-CM

## 2024-03-20 RX ORDER — NITROFURANTOIN 25; 75 MG/1; MG/1
100 CAPSULE ORAL 2 TIMES DAILY
Qty: 14 CAPSULE | Refills: 0 | Status: SHIPPED | OUTPATIENT
Start: 2024-03-20 | End: 2024-04-04

## 2024-03-21 LAB — BACTERIA UR CULT: ABNORMAL

## 2024-03-28 ENCOUNTER — PHARMACY VISIT (OUTPATIENT)
Dept: PHARMACY | Facility: CLINIC | Age: 34
End: 2024-03-28
Payer: MEDICAID

## 2024-03-28 ENCOUNTER — ROUTINE PRENATAL (OUTPATIENT)
Dept: OBSTETRICS AND GYNECOLOGY | Facility: CLINIC | Age: 34
End: 2024-03-28
Payer: COMMERCIAL

## 2024-03-28 VITALS — DIASTOLIC BLOOD PRESSURE: 69 MMHG | WEIGHT: 230 LBS | SYSTOLIC BLOOD PRESSURE: 106 MMHG | BODY MASS INDEX: 37.12 KG/M2

## 2024-03-28 DIAGNOSIS — Z3A.18 18 WEEKS GESTATION OF PREGNANCY (HHS-HCC): ICD-10-CM

## 2024-03-28 DIAGNOSIS — Z34.82 PRENATAL CARE, SUBSEQUENT PREGNANCY IN SECOND TRIMESTER (HHS-HCC): Primary | ICD-10-CM

## 2024-03-28 DIAGNOSIS — E66.9 CLASS II OBESITY: ICD-10-CM

## 2024-03-28 PROCEDURE — 99214 OFFICE O/P EST MOD 30 MIN: CPT | Performed by: ADVANCED PRACTICE MIDWIFE

## 2024-03-28 PROCEDURE — 99078 GROUP HEALTH EDUCATION: CPT | Performed by: ADVANCED PRACTICE MIDWIFE

## 2024-03-28 PROCEDURE — RXMED WILLOW AMBULATORY MEDICATION CHARGE

## 2024-03-28 PROCEDURE — H1002 CARECOORDINATION PRENATAL: HCPCS | Performed by: ADVANCED PRACTICE MIDWIFE

## 2024-03-28 NOTE — LETTER
3/28/2024    To Whom It May Concern:    This is to certify that my patient,Parvez Russell is under my care for her pregnancy.    The following guidelines may be used for any dental work:  You may use a local anesthetic with or without Epinephrine.  You may prescribe any Penicillin or Cephalosporin if an antibiotic is necessary. (Dependent on allergy history)  You may take x-rays with a lead apron if necessary.  You may prescribe Tylenol and/or narcotics for pain.  You may extract teeth if necessary.    If you need further information or if you have any concerns, please contact our office.    Sincerely,        Emma Duarte, DOUGLAS-CNLUCIA, APRN-CNP   [facility] [employer]

## 2024-03-28 NOTE — LETTER
3/28/2024    To Whom It May Concern:    This is to certify that my patient,Parvez Russell is under my care for her pregnancy.    The following guidelines may be used for any dental work:  You may use a local anesthetic with or without Epinephrine.  You may prescribe any Penicillin or Cephalosporin if an antibiotic is necessary. (Dependent on allergy history)  You may take x-rays with a lead apron if necessary.  You may prescribe Tylenol and/or narcotics for pain.  You may extract teeth if necessary.    If you need further information or if you have any concerns, please contact our office.    Sincerely,        Emma Duarte, DOUGLAS-CNLUCIA, APRN-CNP   [facility] [place]

## 2024-03-28 NOTE — LETTER
3/28/2024    To Whom It May Concern:    This is to certify that my patient,Parvez Russell is under my care for her pregnancy.    The following guidelines may be used for any dental work:  You may use a local anesthetic with or without Epinephrine.  You may prescribe any Penicillin or Cephalosporin if an antibiotic is necessary. (Dependent on allergy history)  You may take x-rays with a lead apron if necessary.  You may prescribe Tylenol and/or narcotics for pain.  You may extract teeth if necessary.    If you need further information or if you have any concerns, please contact our office.    Sincerely,        Emma Duarte, DOUGLAS-CNLUCIA, APRN-CNP   [facility] [place]

## 2024-03-28 NOTE — PROGRESS NOTES
Subjective   Parvez Russell is a 33 y.o.  at 18w3d with a working estimated date of delivery of 2024, by Ultrasound who presents for Centering #2.     1:1 Visit:  She denies vaginal bleeding, leakage of fluid, or strong pelvic pain.    Was in OB triage on 3/18 for vaginal spotting, urine cx with UTI - Macrobid sent. Parvez hasn't picked it up from pharmacy yet but states she will today after centering.     Objective   Physical Exam  Weight: 104 kg (230 lb)  Expected Total Weight Gain: 5 kg (11 lb)-9 kg (19 lb)   Pregravid BMI: 35.04  BP: 106/69  Fetal Heart Rate: 140      Problem List Items Addressed This Visit       Prenatal care, subsequent pregnancy in second trimester - Primary    18 weeks gestation of pregnancy    Overview     Dating:   [x] Initial BMI: 36  [x] Prenatal Labs: wnl  [x] Aneuploidy Screening:  rr cfDNA  [x] Baby ASA: since 12 wks  [x] Anatomy US: scheduled   [] 1hr GCT at 24-28wks:  [] Tdap (27-36wks):  [] Flu Shot:  [] COVID vaccine:   [] Rhogam (if Rh neg):   [] GBS at 36 wks:  [] Breastfeeding  [] PPBC:   [] 39 weeks discussion of IOL vs. Expectant management:  [] Mode of delivery:           Class II obesity    Overview     BMI = 36.11 at NOB  Fetal surveillance BMI 35-39.9 at NOB:  Growth US at 30 and 36 wks  BPP or NST weekly 37 wks to delivery    Fetal surveillance BMI >40 at NOB:  Growth US at 30 and 36 wks  BPP or NST weekly 34 wks to delivery    Timing of delivery: 39 0/7 - 39 6/7 wks  *BMI >= 50 at any time in pregnancy: Deliver at Level 4              Centering Session #2 Plan:   Anatomy US scheduled   Discussed importance of picking up antibiotics for UTI    -The following educational material was reviewed:  Common discomforts/Changes in body  Self-Care / back care  Dental health  -Reviewed reasons to call CNM on-call:  vaginal bleeding, loss of fluid, severe pelvic pain, or any questions/concerns  -RTC for next Centering visit in 4 weeks or prn     1:1  total time 15 min  Centering Visit total time 120min    ISAAC SanchezN, RN, SNM     I was present with the APRN student who participated in the documentation of this note. I have personally seen and examined the patient and performed the medical decision-making components. I have reviewed the APRN student documentation and verified the findings in the note as written with additions or exceptions as stated in the body of this note.  Emma BELCHER

## 2024-04-01 ENCOUNTER — HOSPITAL ENCOUNTER (OUTPATIENT)
Dept: RADIOLOGY | Facility: CLINIC | Age: 34
Discharge: HOME | End: 2024-04-01
Payer: COMMERCIAL

## 2024-04-01 ENCOUNTER — ROUTINE PRENATAL (OUTPATIENT)
Dept: OBSTETRICS AND GYNECOLOGY | Facility: CLINIC | Age: 34
End: 2024-04-01
Payer: COMMERCIAL

## 2024-04-01 VITALS — DIASTOLIC BLOOD PRESSURE: 67 MMHG | SYSTOLIC BLOOD PRESSURE: 102 MMHG | WEIGHT: 231.4 LBS | BODY MASS INDEX: 37.35 KG/M2

## 2024-04-01 DIAGNOSIS — Z36.2 ENCOUNTER FOR OTHER ANTENATAL SCREENING FOLLOW-UP (HHS-HCC): ICD-10-CM

## 2024-04-01 DIAGNOSIS — Z3A.19 19 WEEKS GESTATION OF PREGNANCY (HHS-HCC): Primary | ICD-10-CM

## 2024-04-01 PROBLEM — Z34.82 PRENATAL CARE, SUBSEQUENT PREGNANCY IN SECOND TRIMESTER (HHS-HCC): Status: RESOLVED | Noted: 2024-01-25 | Resolved: 2024-04-01

## 2024-04-01 PROCEDURE — 76811 OB US DETAILED SNGL FETUS: CPT

## 2024-04-01 PROCEDURE — 76811 OB US DETAILED SNGL FETUS: CPT | Performed by: OBSTETRICS & GYNECOLOGY

## 2024-04-01 PROCEDURE — 99213 OFFICE O/P EST LOW 20 MIN: CPT | Performed by: STUDENT IN AN ORGANIZED HEALTH CARE EDUCATION/TRAINING PROGRAM

## 2024-04-01 PROCEDURE — 99213 OFFICE O/P EST LOW 20 MIN: CPT | Mod: GC,TH | Performed by: STUDENT IN AN ORGANIZED HEALTH CARE EDUCATION/TRAINING PROGRAM

## 2024-04-01 NOTE — PROGRESS NOTES
OB Follow-up  2024         SUBJECTIVE    HPI: Parvez Russell is a 33 y.o.  at 19w0d here for RPNV. Denies contractions, bleeding, or LOF. Reports normal fetal movement. Patient reports doing well. Starting to get things together for the baby. Anatomy today.    OBJECTIVE    Visit Vitals  /67 Comment:    Wt 105 kg (231 lb 6.4 oz)   LMP 10/15/2023   BMI 37.35 kg/m²   OB Status Pregnant   Smoking Status Former   BSA 2.21 m²        FHT: US    ASSESSMENT & PLAN    Parvez Russell is a 33 y.o.  at 19w0d here for the following concerns we addressed today:    19 weeks gestation of pregnancy  - doing well, no complaints  - nutrition appt on   - cont centering     RTC in 4 weeks for centering    Patient seen and evaluated with Dr. Cyril Mahoney MD

## 2024-04-25 ENCOUNTER — ROUTINE PRENATAL (OUTPATIENT)
Dept: OBSTETRICS AND GYNECOLOGY | Facility: CLINIC | Age: 34
End: 2024-04-25
Payer: COMMERCIAL

## 2024-04-25 VITALS — BODY MASS INDEX: 36.96 KG/M2 | DIASTOLIC BLOOD PRESSURE: 63 MMHG | WEIGHT: 229 LBS | SYSTOLIC BLOOD PRESSURE: 111 MMHG

## 2024-04-25 DIAGNOSIS — Z3A.22 22 WEEKS GESTATION OF PREGNANCY (HHS-HCC): ICD-10-CM

## 2024-04-25 DIAGNOSIS — O23.42 URINARY TRACT INFECTION IN MOTHER DURING SECOND TRIMESTER OF PREGNANCY (HHS-HCC): Primary | ICD-10-CM

## 2024-04-25 DIAGNOSIS — E66.9 CLASS II OBESITY: ICD-10-CM

## 2024-04-25 PROBLEM — S90.31XA CONTUSION OF FOOT INCLUDING TOES, RIGHT, INITIAL ENCOUNTER: Status: RESOLVED | Noted: 2024-04-25 | Resolved: 2024-04-25

## 2024-04-25 PROBLEM — S90.121A CONTUSION OF FOOT INCLUDING TOES, RIGHT, INITIAL ENCOUNTER: Status: RESOLVED | Noted: 2024-04-25 | Resolved: 2024-04-25

## 2024-04-25 PROBLEM — S82.402A FRACTURE OF LEFT FIBULA: Status: RESOLVED | Noted: 2024-04-25 | Resolved: 2024-04-25

## 2024-04-25 PROBLEM — S32.009A FRACTURE OF LUMBAR VERTEBRA (MULTI): Status: RESOLVED | Noted: 2024-04-25 | Resolved: 2024-04-25

## 2024-04-25 PROCEDURE — H1002 CARECOORDINATION PRENATAL: HCPCS | Performed by: ADVANCED PRACTICE MIDWIFE

## 2024-04-25 PROCEDURE — 87086 URINE CULTURE/COLONY COUNT: CPT | Performed by: ADVANCED PRACTICE MIDWIFE

## 2024-04-25 PROCEDURE — 99213 OFFICE O/P EST LOW 20 MIN: CPT | Performed by: ADVANCED PRACTICE MIDWIFE

## 2024-04-25 PROCEDURE — 99078 GROUP HEALTH EDUCATION: CPT | Performed by: ADVANCED PRACTICE MIDWIFE

## 2024-04-25 ASSESSMENT — EDINBURGH POSTNATAL DEPRESSION SCALE (EPDS)
I HAVE BEEN ANXIOUS OR WORRIED FOR NO GOOD REASON: YES, SOMETIMES
THINGS HAVE BEEN GETTING ON TOP OF ME: NO, MOST OF THE TIME I HAVE COPED QUITE WELL
THE THOUGHT OF HARMING MYSELF HAS OCCURRED TO ME: NEVER
I HAVE BEEN SO UNHAPPY THAT I HAVE BEEN CRYING: NO, NEVER
I HAVE LOOKED FORWARD WITH ENJOYMENT TO THINGS: AS MUCH AS I EVER DID
TOTAL SCORE: 5
I HAVE BLAMED MYSELF UNNECESSARILY WHEN THINGS WENT WRONG: NO, NEVER
I HAVE BEEN SO UNHAPPY THAT I HAVE HAD DIFFICULTY SLEEPING: NOT VERY OFTEN
I HAVE FELT SAD OR MISERABLE: NO, NOT AT ALL
I HAVE BEEN ABLE TO LAUGH AND SEE THE FUNNY SIDE OF THINGS: AS MUCH AS I ALWAYS COULD
I HAVE FELT SCARED OR PANICKY FOR NO GOOD REASON: NO, NOT MUCH

## 2024-04-25 NOTE — PROGRESS NOTES
Subjective   Parvez Russell is a 33 y.o.  at 22w3d with a working estimated date of delivery of 2024, by Ultrasound who presents for Centering #3.     1:1 Visit:  She denies vaginal bleeding, leakage of fluid or contractions/strong pelvic pain. Patient endorses fetal movement. Doing well and n/v resolved, had UTI in march, completed abx treatment.    Objective   Physical Exam:   Weight: 104 kg (229 lb)  Expected Total Weight Gain: 5 kg (11 lb)-9 kg (19 lb)   Pregravid BMI: 35.04  BP: 111/63  Fetal Heart Rate: 147 Fundal Height (cm): 21 cm              Postpartum Depression: Medium Risk (2024)    [place]  Depression Scale     Last EPDS Total Score: 5     Last EPDS Self Harm Result: Never        Problem List Items Addressed This Visit       Urinary tract infection in mother during second trimester of pregnancy ([facility]) - Primary    Overview     E. faecalis UTI in march s/p tx  [ ]AMI - collected          Current Assessment & Plan     AMI today         Relevant Orders    Urine Culture    Class II obesity    Overview     BMI = 36.11 at NOB  Fetal surveillance BMI 35-39.9 at NOB:  Growth US at 30 and 36 wks  BPP or NST weekly 37 wks to delivery    Fetal surveillance BMI >40 at NOB:  Growth US at 30 and 36 wks  BPP or NST weekly 34 wks to delivery    Timing of delivery: 39 0/7 - 39 6/7 wks  *BMI >= 50 at any time in pregnancy: Deliver at Level 4           22 weeks gestation of pregnancy ([facility])    Overview     Datinwk US  Baby girl Vida  [x] Initial BMI: 36  [x] Prenatal Labs: wnl  [x] Aneuploidy Screening:  rr cfDNA  [x] Baby ASA: since 12 wks  [x] Anatomy US:    [] 1hr GCT at 24-28wks:  [] Tdap (27-36wks):  [] Flu Shot:  [] COVID vaccine:   [] Rhogam (if Rh neg):   [] GBS at 36 wks:  [] Breastfeeding  [] PPBC:   [] 39 weeks discussion of IOL vs. Expectant management:  [] Mode of delivery:           Current Assessment & Plan     Doing well. Care up to date. 1 hr  gtt at next visit            Centering Session #3 Plan:   Discussed diabetes screening and routine labs, to be completed next visit  -The following educational material was reviewed:  Dealing with stressors  Mental relaxation practice   labor signs/symptoms  -Reviewed reasons to call CNM on-call:  vaginal bleeding, loss of fluid, severe pelvic pain, or any questions/concerns  -RTC for next Centering visit in 4 weeks or prn     1:1 total time 10 min  Centering Visit total time 120min     Seen and discussed with MARCO Hyman MD PGY-1    I was present with the resident who participated in the documentation of this note. I have personally seen and examined the patient and performed the medical decision-making components. I have reviewed the resident documentation and verified the findings in the note as written with additions or exceptions as stated in the body of this note.  Emma COLE-NAWAFM

## 2024-04-26 LAB — BACTERIA UR CULT: NORMAL

## 2024-04-27 PROBLEM — Z34.80 SUPERVISION OF OTHER NORMAL PREGNANCY, ANTEPARTUM (HHS-HCC): Status: ACTIVE | Noted: 2024-01-25

## 2024-05-23 ENCOUNTER — ROUTINE PRENATAL (OUTPATIENT)
Dept: OBSTETRICS AND GYNECOLOGY | Facility: CLINIC | Age: 34
End: 2024-05-23
Payer: COMMERCIAL

## 2024-05-23 ENCOUNTER — HOSPITAL ENCOUNTER (OUTPATIENT)
Dept: RADIOLOGY | Facility: HOSPITAL | Age: 34
Discharge: HOME | End: 2024-05-23
Payer: COMMERCIAL

## 2024-05-23 ENCOUNTER — LAB (OUTPATIENT)
Dept: LAB | Facility: LAB | Age: 34
End: 2024-05-23
Payer: COMMERCIAL

## 2024-05-23 VITALS — BODY MASS INDEX: 36.64 KG/M2 | SYSTOLIC BLOOD PRESSURE: 113 MMHG | DIASTOLIC BLOOD PRESSURE: 70 MMHG | WEIGHT: 227 LBS

## 2024-05-23 DIAGNOSIS — O23.42 URINARY TRACT INFECTION IN MOTHER DURING SECOND TRIMESTER OF PREGNANCY (HHS-HCC): ICD-10-CM

## 2024-05-23 DIAGNOSIS — E66.9 CLASS II OBESITY: ICD-10-CM

## 2024-05-23 DIAGNOSIS — Z34.80 SUPERVISION OF OTHER NORMAL PREGNANCY, ANTEPARTUM (HHS-HCC): ICD-10-CM

## 2024-05-23 DIAGNOSIS — Z34.80 SUPERVISION OF OTHER NORMAL PREGNANCY, ANTEPARTUM (HHS-HCC): Primary | ICD-10-CM

## 2024-05-23 DIAGNOSIS — Z3A.26 26 WEEKS GESTATION OF PREGNANCY (HHS-HCC): ICD-10-CM

## 2024-05-23 LAB
ERYTHROCYTE [DISTWIDTH] IN BLOOD BY AUTOMATED COUNT: 13.7 % (ref 11.5–14.5)
GLUCOSE 1H P 50 G GLC PO SERPL-MCNC: 81 MG/DL
HCT VFR BLD AUTO: 36.4 % (ref 36–46)
HGB BLD-MCNC: 11.5 G/DL (ref 12–16)
MCH RBC QN AUTO: 28.8 PG (ref 26–34)
MCHC RBC AUTO-ENTMCNC: 31.6 G/DL (ref 32–36)
MCV RBC AUTO: 91 FL (ref 80–100)
NRBC BLD-RTO: 0 /100 WBCS (ref 0–0)
PLATELET # BLD AUTO: 143 X10*3/UL (ref 150–450)
RBC # BLD AUTO: 3.99 X10*6/UL (ref 4–5.2)
REFLEX ADDED, ANEMIA PANEL: NORMAL
TREPONEMA PALLIDUM IGG+IGM AB [PRESENCE] IN SERUM OR PLASMA BY IMMUNOASSAY: NONREACTIVE
WBC # BLD AUTO: 7.7 X10*3/UL (ref 4.4–11.3)

## 2024-05-23 PROCEDURE — 99213 OFFICE O/P EST LOW 20 MIN: CPT | Mod: TH | Performed by: ADVANCED PRACTICE MIDWIFE

## 2024-05-23 PROCEDURE — H1002 CARECOORDINATION PRENATAL: HCPCS | Performed by: ADVANCED PRACTICE MIDWIFE

## 2024-05-23 PROCEDURE — 76821 MIDDLE CEREBRAL ARTERY ECHO: CPT | Performed by: OBSTETRICS & GYNECOLOGY

## 2024-05-23 PROCEDURE — 76816 OB US FOLLOW-UP PER FETUS: CPT

## 2024-05-23 PROCEDURE — 76816 OB US FOLLOW-UP PER FETUS: CPT | Performed by: OBSTETRICS & GYNECOLOGY

## 2024-05-23 PROCEDURE — 85027 COMPLETE CBC AUTOMATED: CPT

## 2024-05-23 PROCEDURE — 99078 GROUP HEALTH EDUCATION: CPT | Performed by: ADVANCED PRACTICE MIDWIFE

## 2024-05-23 PROCEDURE — 36415 COLL VENOUS BLD VENIPUNCTURE: CPT

## 2024-05-23 PROCEDURE — 86780 TREPONEMA PALLIDUM: CPT

## 2024-05-23 PROCEDURE — 76820 UMBILICAL ARTERY ECHO: CPT | Performed by: OBSTETRICS & GYNECOLOGY

## 2024-05-23 PROCEDURE — 82947 ASSAY GLUCOSE BLOOD QUANT: CPT

## 2024-05-23 PROCEDURE — 76819 FETAL BIOPHYS PROFIL W/O NST: CPT | Performed by: OBSTETRICS & GYNECOLOGY

## 2024-05-23 PROCEDURE — 99213 OFFICE O/P EST LOW 20 MIN: CPT | Performed by: ADVANCED PRACTICE MIDWIFE

## 2024-05-23 NOTE — PROGRESS NOTES
Subjective   Parvez Russell is a 33 y.o.  at 26w3d with a working estimated date of delivery of 2024, by Ultrasound who presents for Centering #4.    1:1 Visit:  She denies vaginal bleeding, leakage of fluid, or strong/consistent contractions. Endorses good fetal movement.     Feeling well, no complaints.   Has not scheduled anatomy US yet - patient sent to  to schedule.     Objective   Physical Exam  Weight: 103 kg (227 lb)  Expected Total Weight Gain: 5 kg (11 lb)-9 kg (19 lb)   Pregravid BMI: 35.04  BP: 113/70  Fetal Heart Rate: 145 Fundal Height (cm): 27 cm    Problem List Items Addressed This Visit       Urinary tract infection in mother during second trimester of pregnancy ([facility])    Overview     E. faecalis UTI in march s/p tx  WNL AMI - collected          Supervision of other normal pregnancy, antepartum ([facility]) - Primary    Overview     Centering  Desired provider in labor: [] CNM  [] Physician  [x] Dated by: 6w US  [x] Initial BMI: 36  [x] Prenatal Labs: WNL  [x] Rh status: O+  [x] Genetic Screening:  rr cf DNA  [x] Baby ASA: prescribed at 12w     [] Anatomy US:  [x] Fetal Sex: Baby girl Vida  [] Patient added to BirthTracks  [] 1hr GCT at 24-28wks:  [] 3 hr GTT (if indicated):  [] Fetal Surveillance (if indicated):    [] Tdap (27-36wks):  [x] Flu Shot: 23    [] Breastfeeding:  [] Pain management during labor:   [] Postpartum Birth control method:   [] Labor Support:   [] GBS at 36 wks:         Relevant Orders    CBC Anemia Panel With Reflex,Pregnancy    Glucose, 1 Hour Screen, Pregnancy    Syphilis Screen with Reflex    US MAC OB imaging order    Class II obesity    Overview     BMI = 36.11 at NOB  Fetal surveillance BMI 35-39.9 at NOB:  Growth US at 30 and 36 wks  BPP or NST weekly 37 wks to delivery    Timing of delivery: 39 0/7 - 39 6/7 wks  *BMI >= 50 at any time in pregnancy: Deliver at Level 4           26 weeks gestation of pregnancy ([facility])        Centering Session #4 Plan:   Anatomy US scheduled  Growth US scheduled for 30 wks for Class II Obesity   Discussed diabetes screening and routine labs, to be completed today  -The following educational material was reviewed:  Birth control  Breastfeeding benefits   Family I want to have   -Reviewed reasons to call CNM on-call: decreased fetal movement, vaginal bleeding, loss of fluid, increased pelvic pain/contractions, or any questions/concerns  -RTC in 4 weeks for next Centering visit or prn  next visit: Tdap    1:1 total time 10min  Centering Visit total time 120min    Emma Duarte, DOUGLAS-MARCO, APRN-CNP

## 2024-06-17 ENCOUNTER — HOSPITAL ENCOUNTER (OUTPATIENT)
Dept: RADIOLOGY | Facility: CLINIC | Age: 34
Discharge: HOME | End: 2024-06-17
Payer: COMMERCIAL

## 2024-06-17 DIAGNOSIS — O46.92 VAGINAL BLEEDING IN PREGNANCY, SECOND TRIMESTER (HHS-HCC): ICD-10-CM

## 2024-06-17 DIAGNOSIS — E66.9 CLASS 2 OBESITY: ICD-10-CM

## 2024-06-17 PROCEDURE — 76819 FETAL BIOPHYS PROFIL W/O NST: CPT

## 2024-06-17 PROCEDURE — 76816 OB US FOLLOW-UP PER FETUS: CPT | Performed by: OBSTETRICS & GYNECOLOGY

## 2024-06-17 PROCEDURE — 76816 OB US FOLLOW-UP PER FETUS: CPT

## 2024-06-17 PROCEDURE — 76819 FETAL BIOPHYS PROFIL W/O NST: CPT | Performed by: OBSTETRICS & GYNECOLOGY

## 2024-06-20 ENCOUNTER — ROUTINE PRENATAL (OUTPATIENT)
Dept: OBSTETRICS AND GYNECOLOGY | Facility: CLINIC | Age: 34
End: 2024-06-20
Payer: COMMERCIAL

## 2024-06-20 ENCOUNTER — LAB (OUTPATIENT)
Dept: LAB | Facility: LAB | Age: 34
End: 2024-06-20
Payer: COMMERCIAL

## 2024-06-20 VITALS — SYSTOLIC BLOOD PRESSURE: 98 MMHG | WEIGHT: 227 LBS | DIASTOLIC BLOOD PRESSURE: 66 MMHG | BODY MASS INDEX: 36.64 KG/M2

## 2024-06-20 DIAGNOSIS — Z34.83 PRENATAL CARE, SUBSEQUENT PREGNANCY IN THIRD TRIMESTER (HHS-HCC): ICD-10-CM

## 2024-06-20 DIAGNOSIS — D69.6 THROMBOCYTOPENIA AFFECTING PREGNANCY (MULTI): ICD-10-CM

## 2024-06-20 DIAGNOSIS — L29.8 PRURITUS OF PALM: ICD-10-CM

## 2024-06-20 DIAGNOSIS — O99.119 THROMBOCYTOPENIA AFFECTING PREGNANCY (MULTI): ICD-10-CM

## 2024-06-20 DIAGNOSIS — Z3A.30 30 WEEKS GESTATION OF PREGNANCY (HHS-HCC): Primary | ICD-10-CM

## 2024-06-20 DIAGNOSIS — Z30.09 STERILIZATION CONSULT: ICD-10-CM

## 2024-06-20 DIAGNOSIS — Z34.80 SUPERVISION OF OTHER NORMAL PREGNANCY, ANTEPARTUM (HHS-HCC): ICD-10-CM

## 2024-06-20 PROBLEM — L29.89 PRURITUS OF PALM: Status: ACTIVE | Noted: 2024-06-20

## 2024-06-20 LAB
ALBUMIN SERPL BCP-MCNC: 3.3 G/DL (ref 3.4–5)
ALP SERPL-CCNC: 66 U/L (ref 33–110)
ALT SERPL W P-5'-P-CCNC: 7 U/L (ref 7–45)
ANION GAP SERPL CALC-SCNC: 12 MMOL/L (ref 10–20)
AST SERPL W P-5'-P-CCNC: 9 U/L (ref 9–39)
BILIRUB SERPL-MCNC: 0.4 MG/DL (ref 0–1.2)
BUN SERPL-MCNC: 5 MG/DL (ref 6–23)
CALCIUM SERPL-MCNC: 8.9 MG/DL (ref 8.6–10.6)
CHLORIDE SERPL-SCNC: 106 MMOL/L (ref 98–107)
CO2 SERPL-SCNC: 22 MMOL/L (ref 21–32)
CREAT SERPL-MCNC: 0.42 MG/DL (ref 0.5–1.05)
EGFRCR SERPLBLD CKD-EPI 2021: >90 ML/MIN/1.73M*2
ERYTHROCYTE [DISTWIDTH] IN BLOOD BY AUTOMATED COUNT: 13.6 % (ref 11.5–14.5)
GLUCOSE SERPL-MCNC: 73 MG/DL (ref 74–99)
HCT VFR BLD AUTO: 34.7 % (ref 36–46)
HGB BLD-MCNC: 11 G/DL (ref 12–16)
MCH RBC QN AUTO: 27.8 PG (ref 26–34)
MCHC RBC AUTO-ENTMCNC: 31.7 G/DL (ref 32–36)
MCV RBC AUTO: 88 FL (ref 80–100)
NRBC BLD-RTO: 0 /100 WBCS (ref 0–0)
PLATELET # BLD AUTO: 154 X10*3/UL (ref 150–450)
POTASSIUM SERPL-SCNC: 4 MMOL/L (ref 3.5–5.3)
PROT SERPL-MCNC: 6.3 G/DL (ref 6.4–8.2)
RBC # BLD AUTO: 3.95 X10*6/UL (ref 4–5.2)
REFLEX ADDED, ANEMIA PANEL: NORMAL
SODIUM SERPL-SCNC: 136 MMOL/L (ref 136–145)
WBC # BLD AUTO: 7.8 X10*3/UL (ref 4.4–11.3)

## 2024-06-20 PROCEDURE — 82239 BILE ACIDS TOTAL: CPT

## 2024-06-20 PROCEDURE — 36415 COLL VENOUS BLD VENIPUNCTURE: CPT

## 2024-06-20 PROCEDURE — S9436 LAMAZE CLASS: HCPCS | Performed by: ADVANCED PRACTICE MIDWIFE

## 2024-06-20 PROCEDURE — 90715 TDAP VACCINE 7 YRS/> IM: CPT | Performed by: ADVANCED PRACTICE MIDWIFE

## 2024-06-20 PROCEDURE — 99214 OFFICE O/P EST MOD 30 MIN: CPT | Performed by: ADVANCED PRACTICE MIDWIFE

## 2024-06-20 PROCEDURE — 99214 OFFICE O/P EST MOD 30 MIN: CPT | Mod: TH | Performed by: ADVANCED PRACTICE MIDWIFE

## 2024-06-20 PROCEDURE — 80053 COMPREHEN METABOLIC PANEL: CPT

## 2024-06-20 PROCEDURE — 99078 GROUP HEALTH EDUCATION: CPT | Performed by: ADVANCED PRACTICE MIDWIFE

## 2024-06-20 PROCEDURE — 85027 COMPLETE CBC AUTOMATED: CPT

## 2024-06-20 PROCEDURE — H1002 CARECOORDINATION PRENATAL: HCPCS | Performed by: ADVANCED PRACTICE MIDWIFE

## 2024-06-20 RX ORDER — PRENATAL WITH FERROUS FUM AND FOLIC ACID 3080; 920; 120; 400; 22; 1.84; 3; 20; 10; 1; 12; 200; 27; 25; 2 [IU]/1; [IU]/1; MG/1; [IU]/1; MG/1; MG/1; MG/1; MG/1; MG/1; MG/1; UG/1; MG/1; MG/1; MG/1; MG/1
1 TABLET ORAL DAILY
Qty: 30 TABLET | Refills: 0 | Status: SHIPPED | OUTPATIENT
Start: 2024-06-20 | End: 2025-06-20

## 2024-06-20 NOTE — PROGRESS NOTES
Subjective   Parvez Russell is a 33 y.o.  at 30w3d with a working estimated date of delivery of 2024, by Ultrasound who presents for Centering #5.     1:1 Visit:   She denies vaginal bleeding, leakage of fluid, or strong/consistent contractions. Endorses good fetal movement. Reports increasing lower back pain over the past week.    Reports itching of both palms and soles of feet x 1 week. Denies itching elsewhere on her body. Denies rash, no new soaps/detergents/lotions. No hx of cholestasis in prior pregnancies.    Growth US from 24 indicated EFW at 11%tile and AC 12%tile. Next growth US scheduled for 24.    Objective   Physical Exam:   Weight: 103 kg (227 lb)  Expected Total Weight Gain: 5 kg (11 lb)-9 kg (19 lb)   Pregravid BMI: 35.04  BP: 98/66  Fetal Heart Rate: 144 Fundal Height (cm): 31 cm             Problem List Items Addressed This Visit       Thrombocytopenia affecting pregnancy (Multi)    Relevant Orders    CBC Anemia Panel With Reflex,Pregnancy (Completed)    Supervision of other normal pregnancy, antepartum ([facility])    Overview     Centering  Desired provider in labor: [x] CNM  [] Physician  [x] Dated by: 6w US  [x] Initial BMI: 36  [x] Prenatal Labs: WNL  [x] Rh status: O+  [x] Genetic Screening:  rr cf DNA  [x] Baby ASA: prescribed at 12w     [x] Anatomy US:   [x] Fetal Sex: Baby girl Vida  [] Patient added to BirthTracks  [x] 1hr GCT at 24-28wks: WNL  [] 3 hr GTT (if indicated):  [] Fetal Surveillance (if indicated):    [x] Tdap (27-36wks): 24  [x] Flu Shot: 23    [] Breastfeeding:  [] Pain management during labor:   [] Postpartum Birth control method:   [] Labor Support:   [] GBS at 36 wks:         Sterilization consult    Overview     Tubal consent signed          Pruritus of palm    Relevant Orders    Comprehensive Metabolic Panel (Completed)    Bile Acids, Total    Prenatal care, subsequent pregnancy in third trimester ([facility])    Relevant  Medications    prenatal vitamin calcium-iron-folic (Prenatal Vitamin Plus Low Iron) 27 mg iron- 1 mg tablet    30 weeks gestation of pregnancy ([facility]) - Primary    Relevant Orders    Tdap vaccine, age 7 years and older  (BOOSTRIX) (Completed)       Centering Sessions #5 Plan:  New symptoms of itching of palms and soles of feet, blood work ordered to evaluate for cholestasis of pregnancy  Blood work ordered to monitor platelets in s/o thrombocytopenia of pregnancy  Tdap administered today  Reviewed growth US WNL, next growth scan 7/8/24  -The following educational material was reviewed:  Comfort measures and relaxation options during labor  Stages of labor  Birth video  Pain management options in labor  Movement in labor  -Reviewed s/sx of PTL, warning signs, fetal movement counts, and when to call provider  -Return to clinic in 2 weeks for next Centering visit or prn     1:1 total time 15 min  Centering Visit total time 120min    CORY Kim Student    Bile acids/CMP ordered   CBC to re-evaluate platelets, most recently 143 on 5/23/24    I was present with the APRN student who participated in the documentation of this note. I have personally seen and examined the patient and performed the medical decision-making components. I have reviewed the APRN student documentation and verified the findings in the note as written with additions or exceptions as stated in the body of this note.  Emma BELCHER

## 2024-06-21 LAB — BILE AC SERPL-SCNC: 2 UMOL/L (ref 0–10)

## 2024-07-08 ENCOUNTER — HOSPITAL ENCOUNTER (OUTPATIENT)
Dept: RADIOLOGY | Facility: CLINIC | Age: 34
Discharge: HOME | End: 2024-07-08
Payer: COMMERCIAL

## 2024-07-08 DIAGNOSIS — Z34.80 SUPERVISION OF OTHER NORMAL PREGNANCY, ANTEPARTUM (HHS-HCC): ICD-10-CM

## 2024-07-08 PROCEDURE — 76819 FETAL BIOPHYS PROFIL W/O NST: CPT

## 2024-07-08 PROCEDURE — 76819 FETAL BIOPHYS PROFIL W/O NST: CPT | Performed by: OBSTETRICS & GYNECOLOGY

## 2024-07-08 PROCEDURE — 76816 OB US FOLLOW-UP PER FETUS: CPT | Performed by: OBSTETRICS & GYNECOLOGY

## 2024-07-08 PROCEDURE — 76816 OB US FOLLOW-UP PER FETUS: CPT

## 2024-07-11 ENCOUNTER — LAB (OUTPATIENT)
Dept: LAB | Facility: LAB | Age: 34
End: 2024-07-11
Payer: COMMERCIAL

## 2024-07-11 ENCOUNTER — ROUTINE PRENATAL (OUTPATIENT)
Dept: OBSTETRICS AND GYNECOLOGY | Facility: CLINIC | Age: 34
End: 2024-07-11
Payer: COMMERCIAL

## 2024-07-11 VITALS — DIASTOLIC BLOOD PRESSURE: 60 MMHG | BODY MASS INDEX: 36.9 KG/M2 | SYSTOLIC BLOOD PRESSURE: 100 MMHG | WEIGHT: 228.6 LBS

## 2024-07-11 DIAGNOSIS — O46.92 VAGINAL BLEEDING IN PREGNANCY, SECOND TRIMESTER (HHS-HCC): ICD-10-CM

## 2024-07-11 DIAGNOSIS — Z3A.26 26 WEEKS GESTATION OF PREGNANCY (HHS-HCC): ICD-10-CM

## 2024-07-11 DIAGNOSIS — R42 LIGHT HEADEDNESS: Primary | ICD-10-CM

## 2024-07-11 DIAGNOSIS — R42 LIGHT HEADEDNESS: ICD-10-CM

## 2024-07-11 DIAGNOSIS — Z3A.30 30 WEEKS GESTATION OF PREGNANCY (HHS-HCC): ICD-10-CM

## 2024-07-11 DIAGNOSIS — Z34.80 SUPERVISION OF OTHER NORMAL PREGNANCY, ANTEPARTUM (HHS-HCC): ICD-10-CM

## 2024-07-11 DIAGNOSIS — O99.119 THROMBOCYTOPENIA AFFECTING PREGNANCY (MULTI): ICD-10-CM

## 2024-07-11 DIAGNOSIS — D69.6 THROMBOCYTOPENIA AFFECTING PREGNANCY (MULTI): ICD-10-CM

## 2024-07-11 DIAGNOSIS — Z34.83 PRENATAL CARE, SUBSEQUENT PREGNANCY IN THIRD TRIMESTER (HHS-HCC): ICD-10-CM

## 2024-07-11 DIAGNOSIS — O23.42 URINARY TRACT INFECTION IN MOTHER DURING SECOND TRIMESTER OF PREGNANCY (HHS-HCC): ICD-10-CM

## 2024-07-11 LAB
ALBUMIN SERPL BCP-MCNC: 3.4 G/DL (ref 3.4–5)
ALP SERPL-CCNC: 68 U/L (ref 33–110)
ALT SERPL W P-5'-P-CCNC: 6 U/L (ref 7–45)
ANION GAP SERPL CALC-SCNC: 14 MMOL/L (ref 10–20)
AST SERPL W P-5'-P-CCNC: 9 U/L (ref 9–39)
BILIRUB SERPL-MCNC: 0.4 MG/DL (ref 0–1.2)
BUN SERPL-MCNC: 5 MG/DL (ref 6–23)
CALCIUM SERPL-MCNC: 9.1 MG/DL (ref 8.6–10.6)
CHLORIDE SERPL-SCNC: 106 MMOL/L (ref 98–107)
CO2 SERPL-SCNC: 21 MMOL/L (ref 21–32)
CREAT SERPL-MCNC: 0.46 MG/DL (ref 0.5–1.05)
EGFRCR SERPLBLD CKD-EPI 2021: >90 ML/MIN/1.73M*2
ERYTHROCYTE [DISTWIDTH] IN BLOOD BY AUTOMATED COUNT: 13.5 % (ref 11.5–14.5)
GLUCOSE SERPL-MCNC: 70 MG/DL (ref 74–99)
HCT VFR BLD AUTO: 34.8 % (ref 36–46)
HGB BLD-MCNC: 11.1 G/DL (ref 12–16)
MCH RBC QN AUTO: 28.1 PG (ref 26–34)
MCHC RBC AUTO-ENTMCNC: 31.9 G/DL (ref 32–36)
MCV RBC AUTO: 88 FL (ref 80–100)
NRBC BLD-RTO: 0 /100 WBCS (ref 0–0)
PLATELET # BLD AUTO: 154 X10*3/UL (ref 150–450)
POTASSIUM SERPL-SCNC: 4 MMOL/L (ref 3.5–5.3)
PROT SERPL-MCNC: 6.3 G/DL (ref 6.4–8.2)
RBC # BLD AUTO: 3.95 X10*6/UL (ref 4–5.2)
REFLEX ADDED, ANEMIA PANEL: NORMAL
SODIUM SERPL-SCNC: 137 MMOL/L (ref 136–145)
WBC # BLD AUTO: 7.6 X10*3/UL (ref 4.4–11.3)

## 2024-07-11 PROCEDURE — 80053 COMPREHEN METABOLIC PANEL: CPT

## 2024-07-11 PROCEDURE — 36415 COLL VENOUS BLD VENIPUNCTURE: CPT

## 2024-07-11 PROCEDURE — 85027 COMPLETE CBC AUTOMATED: CPT

## 2024-07-11 PROCEDURE — 99213 OFFICE O/P EST LOW 20 MIN: CPT | Performed by: ADVANCED PRACTICE MIDWIFE

## 2024-07-11 PROCEDURE — 99213 OFFICE O/P EST LOW 20 MIN: CPT | Mod: TH | Performed by: ADVANCED PRACTICE MIDWIFE

## 2024-07-11 NOTE — PROGRESS NOTES
"Subjective   Parvez Russell is a 33 y.o.  at 33w3d with a working estimated date of delivery of 2024, by Ultrasound who presents for a routine prenatal visit.     She denies vaginal bleeding, leakage of fluid, decreased fetal movements, or contractions.  Light headed dizziness recently in last week  See starts    Objective   Physical Exam:   Weight: 104 kg (228 lb 9.6 oz)  TW.262 kg (11 lb 9.6 oz)  BP: 100/60 (HR 90)  Fetal Heart Rate: 137 Fundal Height (cm): 32 cm Presentation: Vertex  GEN: NAD  CV: RRR s1,s2 no murmur  Lungs RR even unlabored  Extremities bilateral non -pitting edema         Postpartum Depression: Medium Risk (2024)    [place]  Depression Scale     Last EPDS Total Score: 5     Last EPDS Self Harm Result: Never        Prenatal Labs  Lab Results   Component Value Date    HGB 11.0 (L) 2024    HCT 34.7 (L) 2024     2024    SYPHT Nonreactive 2024     Lab Results   Component Value Date    GLUC1P 81 2024     No results found for: \"GRPBSTREP\"     Imaging  The most recent ultrasound was performed on  with a study GA of 33w0 and EFW 15%, AC 33%.     Assessment/Plan   Problem List Items Addressed This Visit          Ob-Gyn Problems    26 weeks gestation of pregnancy ([facility])    30 weeks gestation of pregnancy ([facility])    Prenatal care, subsequent pregnancy in third trimester ([facility])    Supervision of other normal pregnancy, antepartum ([facility])    Overview     Centering  Desired provider in labor: [x] CNM  [] Physician  [x] Dated by: 6w US  [x] Initial BMI: 36  [x] Prenatal Labs: WNL  [x] Rh status: O+  [x] Genetic Screening:  rr cf DNA  [x] Baby ASA: prescribed at 12w     [x] Anatomy US:   [x] Fetal Sex: Baby girl Vida  [] Patient added to BirthTracks  [x] 1hr GCT at 24-28wks: WNL  [] 3 hr GTT (if indicated):  [] Fetal Surveillance (if indicated):    [x] Tdap (27-36wks): 24  [x] Flu Shot: 23    [] " Breastfeeding:  [] Pain management during labor:   [] Postpartum Birth control method:   [] Labor Support:   [] GBS at 36 wks:         Thrombocytopenia affecting pregnancy (Multi)    Urinary tract infection in mother during second trimester of pregnancy ([facility])    Overview     E. faecalis UTI in march s/p tx  WNL AMI - collected 4/25         Vaginal bleeding in pregnancy, second trimester ([facility])    Overview     BSUS: ,, cervical length: 37 mm  Mac imaging ordered          Other Visit Diagnoses       Light headedness    -  Primary    Relevant Orders    CBC Anemia Panel With Reflex,Pregnancy    Comprehensive Metabolic Panel        Comfort measures for swelling  If lightheaded or dizziness persists despite hydration, compression stockings, to go to Triage   Reviewed growth US    Growth repeat 36wk  Reviewed s/sx of PTL, warning signs, fetal movement counts, and when to call provider  Follow up in 2 week for a routine prenatal visit.    Karoline Latham, DOUGLAS-MARCO

## 2024-07-18 ENCOUNTER — ROUTINE PRENATAL (OUTPATIENT)
Dept: OBSTETRICS AND GYNECOLOGY | Facility: CLINIC | Age: 34
End: 2024-07-18
Payer: COMMERCIAL

## 2024-07-18 VITALS — SYSTOLIC BLOOD PRESSURE: 111 MMHG | WEIGHT: 228.3 LBS | BODY MASS INDEX: 36.85 KG/M2 | DIASTOLIC BLOOD PRESSURE: 74 MMHG

## 2024-07-18 DIAGNOSIS — Z34.80 SUPERVISION OF OTHER NORMAL PREGNANCY, ANTEPARTUM (HHS-HCC): Primary | ICD-10-CM

## 2024-07-18 DIAGNOSIS — O23.42 URINARY TRACT INFECTION IN MOTHER DURING SECOND TRIMESTER OF PREGNANCY (HHS-HCC): ICD-10-CM

## 2024-07-18 DIAGNOSIS — Z34.83 PRENATAL CARE, SUBSEQUENT PREGNANCY IN THIRD TRIMESTER (HHS-HCC): ICD-10-CM

## 2024-07-18 DIAGNOSIS — E66.9 CLASS II OBESITY: ICD-10-CM

## 2024-07-18 DIAGNOSIS — O99.119 THROMBOCYTOPENIA AFFECTING PREGNANCY (MULTI): ICD-10-CM

## 2024-07-18 DIAGNOSIS — D69.6 THROMBOCYTOPENIA AFFECTING PREGNANCY (MULTI): ICD-10-CM

## 2024-07-18 DIAGNOSIS — Z3A.30 30 WEEKS GESTATION OF PREGNANCY (HHS-HCC): ICD-10-CM

## 2024-07-18 PROCEDURE — H1002 CARECOORDINATION PRENATAL: HCPCS | Performed by: ADVANCED PRACTICE MIDWIFE

## 2024-07-18 PROCEDURE — 99213 OFFICE O/P EST LOW 20 MIN: CPT | Performed by: ADVANCED PRACTICE MIDWIFE

## 2024-07-18 PROCEDURE — 99213 OFFICE O/P EST LOW 20 MIN: CPT | Mod: TH | Performed by: ADVANCED PRACTICE MIDWIFE

## 2024-07-18 PROCEDURE — 99078 GROUP HEALTH EDUCATION: CPT | Performed by: ADVANCED PRACTICE MIDWIFE

## 2024-07-18 PROCEDURE — S9447 INFANT SAFETY CLASS: HCPCS | Performed by: ADVANCED PRACTICE MIDWIFE

## 2024-07-18 PROCEDURE — S9444 PARENTING CLASS: HCPCS | Performed by: ADVANCED PRACTICE MIDWIFE

## 2024-07-18 NOTE — PROGRESS NOTES
Subjective   Parvez Russell is a 33 y.o.  at 34w3d with a working estimated date of delivery of 2024, by Ultrasound who presents for Centering visit #7.     1:1 Visit:   She denies vaginal bleeding, leakage of fluid, or strong/consistent contractions. Endorses good fetal movement. Reports occasional Day-Waller, lasting about 30-40 sec, resting helps. Reports drinking lots of water, 12-15 cups per day.    Reports episodes of feeling faint and light-headed, occasionally sees stars, quickly resolves on its own. Occurs more frequently for the past 1-2 weeks. Eats regular meals and snacks. Reports it feels like her blood sugar is low but she's not hungry.     Reports swelling in feet and ankles, felt it made it difficult to walk. Per patient, swelling has improved. Has been wearing compression socks which help.     Reports resolution of itching of hands and feet. Labs for ICP negative on     Objective   Physical Exam:   Weight: 104 kg (228 lb 4.8 oz)  Expected Total Weight Gain: 5 kg (11 lb)-9 kg (19 lb)   Pregravid BMI: 35.04  BP: 111/74  Fetal Heart Rate: 150 Fundal Height (cm): 35 cm Presentation: Vertex           Problem List Items Addressed This Visit       Urinary tract infection in mother during second trimester of pregnancy ([facility])    Overview     E. faecalis UTI in march s/p tx  WNL AMI - collected          Thrombocytopenia affecting pregnancy (Multi)    Overview      143   154         Supervision of other normal pregnancy, antepartum ([facility]) - Primary    Overview     Centering  Desired provider in labor: [x] CNM  [] Physician  [x] Dated by: 6w US  [x] Initial BMI: 36  [x] Prenatal Labs: WNL  [x] Rh status: O+  [x] Genetic Screening:  rr cf DNA  [x] Baby ASA: prescribed at 12w     [x] Anatomy US:   [x] Fetal Sex: Baby girl Vida  [] Patient added to BirthTracks  [x] 1hr GCT at 24-28wks: WNL  [] 3 hr GTT (if indicated):  [] Fetal Surveillance (if  indicated):    [x] Tdap (27-36wks): 24  [x] Flu Shot: 23    [] Breastfeeding:  [] Pain management during labor:   [] Postpartum Birth control method:   [] Labor Support:   [] GBS at 36 wks:         Prenatal care, subsequent pregnancy in third trimester ([facility])    Class II obesity    Overview     BMI = 36.11 at NOB  Fetal surveillance BMI 35-39.9 at NOB:  Growth US at 30 and 36 wks  BPP or NST weekly 37 wks to delivery    Timing of delivery: 39 0/7 - 39 6/7 wks  *BMI >= 50 at any time in pregnancy: Deliver at Level 4           30 weeks gestation of pregnancy ([facility])       Centering Sessions #7 Plan:   Reviewed lab results negative ICP work-up, symptoms resolved   Growth US scheduled for   Continue to wear compression socks, elevate legs, and increase hydration to decrease lower extremity swelling  Encouraged to try electrolyte drinks at home. Reviewed warning signs/symptoms, when to return   -The following educational material was reviewed:  [place] care   Circumcision decisions    safety including SIDS risk reduction, safe sleep, car seats   Picking a pediatrician/CenteringParenting    -Reviewed s/sx of PTL, warning signs, fetal movement counts, and when to call provider  -Follow up in 2 weeks for next Centering visit or prn next visit: GBS to be collected at next visit.    1:1 total time 15 min  Centering Visit total time 120min  CORY Kim Student    I was present with the DOUGLAS student who participated in the documentation of this note. I have personally seen and examined the patient and performed the medical decision-making components. I have reviewed the APRN student documentation and verified the findings in the note as written with additions or exceptions as stated in the body of this note.  Emma COLE-MARCO

## 2024-08-01 ENCOUNTER — LAB (OUTPATIENT)
Dept: LAB | Facility: LAB | Age: 34
End: 2024-08-01
Payer: COMMERCIAL

## 2024-08-01 ENCOUNTER — ROUTINE PRENATAL (OUTPATIENT)
Dept: OBSTETRICS AND GYNECOLOGY | Facility: CLINIC | Age: 34
End: 2024-08-01
Payer: COMMERCIAL

## 2024-08-01 ENCOUNTER — HOSPITAL ENCOUNTER (OUTPATIENT)
Dept: RADIOLOGY | Facility: CLINIC | Age: 34
Discharge: HOME | End: 2024-08-01
Payer: COMMERCIAL

## 2024-08-01 ENCOUNTER — PHARMACY VISIT (OUTPATIENT)
Dept: PHARMACY | Facility: CLINIC | Age: 34
End: 2024-08-01
Payer: MEDICAID

## 2024-08-01 VITALS — WEIGHT: 229 LBS | SYSTOLIC BLOOD PRESSURE: 95 MMHG | DIASTOLIC BLOOD PRESSURE: 64 MMHG | BODY MASS INDEX: 36.96 KG/M2

## 2024-08-01 DIAGNOSIS — Z34.80 SUPERVISION OF OTHER NORMAL PREGNANCY, ANTEPARTUM (HHS-HCC): ICD-10-CM

## 2024-08-01 DIAGNOSIS — D69.6 THROMBOCYTOPENIA AFFECTING PREGNANCY (MULTI): ICD-10-CM

## 2024-08-01 DIAGNOSIS — Z30.09 STERILIZATION CONSULT: ICD-10-CM

## 2024-08-01 DIAGNOSIS — O46.92 VAGINAL BLEEDING IN PREGNANCY, SECOND TRIMESTER (HHS-HCC): ICD-10-CM

## 2024-08-01 DIAGNOSIS — L29.8 PRURITUS OF PALM: ICD-10-CM

## 2024-08-01 DIAGNOSIS — Z34.80 SUPERVISION OF OTHER NORMAL PREGNANCY, ANTEPARTUM (HHS-HCC): Primary | ICD-10-CM

## 2024-08-01 DIAGNOSIS — E66.9 CLASS II OBESITY: ICD-10-CM

## 2024-08-01 DIAGNOSIS — O99.119 THROMBOCYTOPENIA AFFECTING PREGNANCY (MULTI): ICD-10-CM

## 2024-08-01 DIAGNOSIS — Z3A.36 36 WEEKS GESTATION OF PREGNANCY (HHS-HCC): ICD-10-CM

## 2024-08-01 DIAGNOSIS — Z34.83 PRENATAL CARE, SUBSEQUENT PREGNANCY IN THIRD TRIMESTER (HHS-HCC): ICD-10-CM

## 2024-08-01 DIAGNOSIS — O23.42 URINARY TRACT INFECTION IN MOTHER DURING SECOND TRIMESTER OF PREGNANCY (HHS-HCC): ICD-10-CM

## 2024-08-01 LAB
ALBUMIN SERPL BCP-MCNC: 3.4 G/DL (ref 3.4–5)
ALP SERPL-CCNC: 78 U/L (ref 33–110)
ALT SERPL W P-5'-P-CCNC: 5 U/L (ref 7–45)
ANION GAP SERPL CALC-SCNC: 14 MMOL/L (ref 10–20)
AST SERPL W P-5'-P-CCNC: 10 U/L (ref 9–39)
BILIRUB SERPL-MCNC: 0.4 MG/DL (ref 0–1.2)
BUN SERPL-MCNC: 8 MG/DL (ref 6–23)
CALCIUM SERPL-MCNC: 8.9 MG/DL (ref 8.6–10.6)
CHLORIDE SERPL-SCNC: 105 MMOL/L (ref 98–107)
CO2 SERPL-SCNC: 20 MMOL/L (ref 21–32)
CREAT SERPL-MCNC: 0.57 MG/DL (ref 0.5–1.05)
EGFRCR SERPLBLD CKD-EPI 2021: >90 ML/MIN/1.73M*2
GLUCOSE SERPL-MCNC: 99 MG/DL (ref 74–99)
POTASSIUM SERPL-SCNC: 4 MMOL/L (ref 3.5–5.3)
PROT SERPL-MCNC: 6.2 G/DL (ref 6.4–8.2)
SODIUM SERPL-SCNC: 135 MMOL/L (ref 136–145)

## 2024-08-01 PROCEDURE — 99214 OFFICE O/P EST MOD 30 MIN: CPT | Performed by: ADVANCED PRACTICE MIDWIFE

## 2024-08-01 PROCEDURE — 80053 COMPREHEN METABOLIC PANEL: CPT

## 2024-08-01 PROCEDURE — 87081 CULTURE SCREEN ONLY: CPT | Performed by: ADVANCED PRACTICE MIDWIFE

## 2024-08-01 PROCEDURE — 76816 OB US FOLLOW-UP PER FETUS: CPT

## 2024-08-01 PROCEDURE — RXMED WILLOW AMBULATORY MEDICATION CHARGE

## 2024-08-01 PROCEDURE — 82239 BILE ACIDS TOTAL: CPT

## 2024-08-01 PROCEDURE — 99214 OFFICE O/P EST MOD 30 MIN: CPT | Mod: TH | Performed by: ADVANCED PRACTICE MIDWIFE

## 2024-08-01 PROCEDURE — 36415 COLL VENOUS BLD VENIPUNCTURE: CPT

## 2024-08-01 PROCEDURE — 76816 OB US FOLLOW-UP PER FETUS: CPT | Performed by: STUDENT IN AN ORGANIZED HEALTH CARE EDUCATION/TRAINING PROGRAM

## 2024-08-01 NOTE — PROGRESS NOTES
Subjective     Parvez Russell is a 34 y.o.  at 36w3d with a working estimated date of delivery of 2024, by Ultrasound who presents for a routine prenatal visit.     She denies vaginal bleeding, leakage of fluid, decreased fetal movements, or contractions.    Reports she started having periodic itching on hands and feet, not worse at night, not happening every day. Denies any other symptoms.     Objective   Physical Exam:   Weight: 104 kg (229 lb)  Expected Total Weight Gain: 5 kg (11 lb)-9 kg (19 lb)   Pregravid BMI: 35.04  BP: 95/64  Fetal Heart Rate: 145 Fundal Height (cm): 37 cm Presentation: Vertex  Dilation: 1 Effacement (%): 30 Fetal Station: -3    Postpartum Depression: Medium Risk (2024)    [place]  Depression Scale     Last EPDS Total Score: 5     Last EPDS Self Harm Result: Never        Problem List Items Addressed This Visit       Vaginal bleeding in pregnancy, second trimester ([facility])    Overview     BSUS: ,, cervical length: 37 mm  Mac imaging ordered         Urinary tract infection in mother during second trimester of pregnancy ([facility])    Overview     E. faecalis UTI in march s/p tx  WNL AMI - collected          Thrombocytopenia affecting pregnancy (Multi)    Overview      143   154         Supervision of other normal pregnancy, antepartum ([facility]) - Primary    Overview     Centering  Desired provider in labor: [x] CNM  [] Physician  [x] Dated by: 6w US  [x] Initial BMI: 36  [x] Prenatal Labs: WNL  [x] Rh status: O+  [x] Genetic Screening:  rr cf DNA  [x] Baby ASA: prescribed at 12w     [x] Anatomy US:   [x] Fetal Sex: Baby girl Vida  [] Patient added to BirthTracks  [x] 1hr GCT at 24-28wks: WNL  [] 3 hr GTT (if indicated):  [] Fetal Surveillance (if indicated):    [x] Tdap (27-36wks): 24  [x] Flu Shot: 23    [] Breastfeeding:  [] Pain management during labor:   [] Postpartum Birth control method:   [] Labor Support:   []  GBS at 36 wks:         Relevant Orders    Fetal nonstress test    Sterilization consult    Overview     Tubal consent signed          Pruritus of palm    Relevant Orders    Comprehensive Metabolic Panel    Bile Acids, Total    Prenatal care, subsequent pregnancy in third trimester ([facility])    Relevant Orders    Group B Streptococcus (GBS) Prenatal Screen, Culture    Class II obesity    Overview     BMI = 36.11 at NOB  Fetal surveillance BMI 35-39.9 at NOB:  Growth US at 30 and 36 wks  BPP or NST weekly 37 wks to delivery    Timing of delivery: 39 0/7 - 39 6/7 wks  *BMI >= 50 at any time in pregnancy: Deliver at Level 4           36 weeks gestation of pregnancy ([facility])     CMP, bile acids order, patient advised to present to care/call if symptoms of itching worsen   Postpartum contraception options discussed, patient initially planning tubal, now leaning towards Paragard IUD  Discussed routine GBS screening, to be completed today   surveillance weekly for Class II obesity - starts weekly NSTs next week    Growth US today   Reviewed s/sx of PTL, warning signs, fetal movement counts, and when to call provider  Follow up in 1 week for a routine prenatal visit.      Emma Duarte, APRN-CNM, APRN-CNP

## 2024-08-03 LAB — GP B STREP GENITAL QL CULT: NORMAL

## 2024-08-08 ENCOUNTER — ANESTHESIA (OUTPATIENT)
Dept: OBSTETRICS AND GYNECOLOGY | Facility: HOSPITAL | Age: 34
End: 2024-08-08
Payer: COMMERCIAL

## 2024-08-08 ENCOUNTER — PROCEDURE VISIT (OUTPATIENT)
Dept: OBSTETRICS AND GYNECOLOGY | Facility: CLINIC | Age: 34
End: 2024-08-08
Payer: COMMERCIAL

## 2024-08-08 ENCOUNTER — ROUTINE PRENATAL (OUTPATIENT)
Dept: OBSTETRICS AND GYNECOLOGY | Facility: CLINIC | Age: 34
End: 2024-08-08
Payer: COMMERCIAL

## 2024-08-08 ENCOUNTER — ANESTHESIA EVENT (OUTPATIENT)
Dept: OBSTETRICS AND GYNECOLOGY | Facility: HOSPITAL | Age: 34
End: 2024-08-08
Payer: COMMERCIAL

## 2024-08-08 ENCOUNTER — HOSPITAL ENCOUNTER (INPATIENT)
Facility: HOSPITAL | Age: 34
LOS: 3 days | Discharge: HOME | End: 2024-08-11
Attending: STUDENT IN AN ORGANIZED HEALTH CARE EDUCATION/TRAINING PROGRAM | Admitting: ADVANCED PRACTICE MIDWIFE
Payer: COMMERCIAL

## 2024-08-08 VITALS — DIASTOLIC BLOOD PRESSURE: 66 MMHG | BODY MASS INDEX: 36.8 KG/M2 | SYSTOLIC BLOOD PRESSURE: 99 MMHG | WEIGHT: 228 LBS

## 2024-08-08 DIAGNOSIS — E66.9 CLASS II OBESITY: ICD-10-CM

## 2024-08-08 DIAGNOSIS — L29.8 PRURITUS OF PALM: ICD-10-CM

## 2024-08-08 DIAGNOSIS — Z3A.37 37 WEEKS GESTATION OF PREGNANCY (HHS-HCC): ICD-10-CM

## 2024-08-08 DIAGNOSIS — Z34.83 PRENATAL CARE, SUBSEQUENT PREGNANCY IN THIRD TRIMESTER (HHS-HCC): ICD-10-CM

## 2024-08-08 DIAGNOSIS — Z34.80 SUPERVISION OF OTHER NORMAL PREGNANCY, ANTEPARTUM (HHS-HCC): Primary | ICD-10-CM

## 2024-08-08 PROBLEM — O26.643 INTRAHEPATIC CHOLESTASIS OF PREGNANCY IN THIRD TRIMESTER: Status: ACTIVE | Noted: 2024-08-08

## 2024-08-08 LAB
ABO GROUP (TYPE) IN BLOOD: NORMAL
ANTIBODY SCREEN: NORMAL
ERYTHROCYTE [DISTWIDTH] IN BLOOD BY AUTOMATED COUNT: 14.1 % (ref 11.5–14.5)
HCT VFR BLD AUTO: 37.2 % (ref 36–46)
HGB BLD-MCNC: 11.4 G/DL (ref 12–16)
MCH RBC QN AUTO: 27.9 PG (ref 26–34)
MCHC RBC AUTO-ENTMCNC: 30.6 G/DL (ref 32–36)
MCV RBC AUTO: 91 FL (ref 80–100)
NRBC BLD-RTO: 0 /100 WBCS (ref 0–0)
PLATELET # BLD AUTO: 157 X10*3/UL (ref 150–450)
RBC # BLD AUTO: 4.09 X10*6/UL (ref 4–5.2)
RH FACTOR (ANTIGEN D): NORMAL
TREPONEMA PALLIDUM IGG+IGM AB [PRESENCE] IN SERUM OR PLASMA BY IMMUNOASSAY: NONREACTIVE
WBC # BLD AUTO: 7.6 X10*3/UL (ref 4.4–11.3)

## 2024-08-08 PROCEDURE — 7210000002 HC LABOR PER HOUR

## 2024-08-08 PROCEDURE — 2500000001 HC RX 250 WO HCPCS SELF ADMINISTERED DRUGS (ALT 637 FOR MEDICARE OP): Performed by: ADVANCED PRACTICE MIDWIFE

## 2024-08-08 PROCEDURE — 10907ZC DRAINAGE OF AMNIOTIC FLUID, THERAPEUTIC FROM PRODUCTS OF CONCEPTION, VIA NATURAL OR ARTIFICIAL OPENING: ICD-10-PCS | Performed by: ADVANCED PRACTICE MIDWIFE

## 2024-08-08 PROCEDURE — 3E0P7VZ INTRODUCTION OF HORMONE INTO FEMALE REPRODUCTIVE, VIA NATURAL OR ARTIFICIAL OPENING: ICD-10-PCS | Performed by: ADVANCED PRACTICE MIDWIFE

## 2024-08-08 PROCEDURE — 86901 BLOOD TYPING SEROLOGIC RH(D): CPT | Performed by: ADVANCED PRACTICE MIDWIFE

## 2024-08-08 PROCEDURE — 86780 TREPONEMA PALLIDUM: CPT | Performed by: ADVANCED PRACTICE MIDWIFE

## 2024-08-08 PROCEDURE — 2500000004 HC RX 250 GENERAL PHARMACY W/ HCPCS (ALT 636 FOR OP/ED): Performed by: ADVANCED PRACTICE MIDWIFE

## 2024-08-08 PROCEDURE — 1120000001 HC OB PRIVATE ROOM DAILY

## 2024-08-08 PROCEDURE — 99214 OFFICE O/P EST MOD 30 MIN: CPT | Mod: TH | Performed by: ADVANCED PRACTICE MIDWIFE

## 2024-08-08 PROCEDURE — 3E033VJ INTRODUCTION OF OTHER HORMONE INTO PERIPHERAL VEIN, PERCUTANEOUS APPROACH: ICD-10-PCS | Performed by: ADVANCED PRACTICE MIDWIFE

## 2024-08-08 PROCEDURE — 99214 OFFICE O/P EST MOD 30 MIN: CPT | Performed by: ADVANCED PRACTICE MIDWIFE

## 2024-08-08 PROCEDURE — 99222 1ST HOSP IP/OBS MODERATE 55: CPT | Performed by: ADVANCED PRACTICE MIDWIFE

## 2024-08-08 PROCEDURE — 36415 COLL VENOUS BLD VENIPUNCTURE: CPT | Performed by: ADVANCED PRACTICE MIDWIFE

## 2024-08-08 PROCEDURE — 85027 COMPLETE CBC AUTOMATED: CPT | Performed by: ADVANCED PRACTICE MIDWIFE

## 2024-08-08 RX ORDER — OXYTOCIN/0.9 % SODIUM CHLORIDE 30/500 ML
2-30 PLASTIC BAG, INJECTION (ML) INTRAVENOUS CONTINUOUS
Status: DISCONTINUED | OUTPATIENT
Start: 2024-08-08 | End: 2024-08-09

## 2024-08-08 RX ORDER — OXYTOCIN 10 [USP'U]/ML
10 INJECTION, SOLUTION INTRAMUSCULAR; INTRAVENOUS ONCE AS NEEDED
Status: DISCONTINUED | OUTPATIENT
Start: 2024-08-08 | End: 2024-08-09 | Stop reason: HOSPADM

## 2024-08-08 RX ORDER — LOPERAMIDE HYDROCHLORIDE 2 MG/1
4 CAPSULE ORAL EVERY 2 HOUR PRN
Status: DISCONTINUED | OUTPATIENT
Start: 2024-08-08 | End: 2024-08-09 | Stop reason: HOSPADM

## 2024-08-08 RX ORDER — FENTANYL/BUPIVACAINE/NS/PF 2MCG/ML-.1
0-54 PLASTIC BAG, INJECTION (ML) INJECTION CONTINUOUS
Status: DISCONTINUED | OUTPATIENT
Start: 2024-08-09 | End: 2024-08-09

## 2024-08-08 RX ORDER — MISOPROSTOL 200 UG/1
800 TABLET ORAL ONCE AS NEEDED
Status: DISCONTINUED | OUTPATIENT
Start: 2024-08-08 | End: 2024-08-09 | Stop reason: HOSPADM

## 2024-08-08 RX ORDER — CARBOPROST TROMETHAMINE 250 UG/ML
250 INJECTION, SOLUTION INTRAMUSCULAR ONCE AS NEEDED
Status: DISCONTINUED | OUTPATIENT
Start: 2024-08-08 | End: 2024-08-09 | Stop reason: HOSPADM

## 2024-08-08 RX ORDER — LIDOCAINE HYDROCHLORIDE 10 MG/ML
30 INJECTION INFILTRATION; PERINEURAL ONCE AS NEEDED
Status: DISCONTINUED | OUTPATIENT
Start: 2024-08-08 | End: 2024-08-09 | Stop reason: HOSPADM

## 2024-08-08 RX ORDER — HYDRALAZINE HYDROCHLORIDE 20 MG/ML
5 INJECTION INTRAMUSCULAR; INTRAVENOUS ONCE AS NEEDED
Status: DISCONTINUED | OUTPATIENT
Start: 2024-08-08 | End: 2024-08-09 | Stop reason: HOSPADM

## 2024-08-08 RX ORDER — LABETALOL HYDROCHLORIDE 5 MG/ML
20 INJECTION, SOLUTION INTRAVENOUS ONCE AS NEEDED
Status: DISCONTINUED | OUTPATIENT
Start: 2024-08-08 | End: 2024-08-09 | Stop reason: HOSPADM

## 2024-08-08 RX ORDER — SODIUM CHLORIDE, SODIUM LACTATE, POTASSIUM CHLORIDE, CALCIUM CHLORIDE 600; 310; 30; 20 MG/100ML; MG/100ML; MG/100ML; MG/100ML
125 INJECTION, SOLUTION INTRAVENOUS CONTINUOUS
Status: DISCONTINUED | OUTPATIENT
Start: 2024-08-08 | End: 2024-08-09

## 2024-08-08 RX ORDER — ONDANSETRON HYDROCHLORIDE 2 MG/ML
4 INJECTION, SOLUTION INTRAVENOUS EVERY 6 HOURS PRN
Status: DISCONTINUED | OUTPATIENT
Start: 2024-08-08 | End: 2024-08-09

## 2024-08-08 RX ORDER — ONDANSETRON 4 MG/1
4 TABLET, FILM COATED ORAL EVERY 6 HOURS PRN
Status: DISCONTINUED | OUTPATIENT
Start: 2024-08-08 | End: 2024-08-09

## 2024-08-08 RX ORDER — METOCLOPRAMIDE HYDROCHLORIDE 5 MG/ML
10 INJECTION INTRAMUSCULAR; INTRAVENOUS EVERY 6 HOURS PRN
Status: DISCONTINUED | OUTPATIENT
Start: 2024-08-08 | End: 2024-08-09

## 2024-08-08 RX ORDER — OXYTOCIN/0.9 % SODIUM CHLORIDE 30/500 ML
60 PLASTIC BAG, INJECTION (ML) INTRAVENOUS ONCE AS NEEDED
Status: DISCONTINUED | OUTPATIENT
Start: 2024-08-08 | End: 2024-08-09 | Stop reason: HOSPADM

## 2024-08-08 RX ORDER — TRANEXAMIC ACID 100 MG/ML
1000 INJECTION, SOLUTION INTRAVENOUS ONCE AS NEEDED
Status: DISCONTINUED | OUTPATIENT
Start: 2024-08-08 | End: 2024-08-09 | Stop reason: HOSPADM

## 2024-08-08 RX ORDER — METOCLOPRAMIDE 10 MG/1
10 TABLET ORAL EVERY 6 HOURS PRN
Status: DISCONTINUED | OUTPATIENT
Start: 2024-08-08 | End: 2024-08-09

## 2024-08-08 RX ORDER — METHYLERGONOVINE MALEATE 0.2 MG/ML
0.2 INJECTION INTRAVENOUS ONCE AS NEEDED
Status: DISCONTINUED | OUTPATIENT
Start: 2024-08-08 | End: 2024-08-09 | Stop reason: HOSPADM

## 2024-08-08 RX ORDER — TERBUTALINE SULFATE 1 MG/ML
0.25 INJECTION SUBCUTANEOUS ONCE AS NEEDED
Status: DISCONTINUED | OUTPATIENT
Start: 2024-08-08 | End: 2024-08-09 | Stop reason: HOSPADM

## 2024-08-08 RX ORDER — NIFEDIPINE 10 MG/1
10 CAPSULE ORAL ONCE AS NEEDED
Status: DISCONTINUED | OUTPATIENT
Start: 2024-08-08 | End: 2024-08-09 | Stop reason: HOSPADM

## 2024-08-08 SDOH — HEALTH STABILITY: MENTAL HEALTH: SUICIDAL BEHAVIOR (LIFETIME): NO

## 2024-08-08 SDOH — SOCIAL STABILITY: SOCIAL INSECURITY: HAS ANYONE EVER THREATENED TO HURT YOUR FAMILY OR YOUR PETS?: NO

## 2024-08-08 SDOH — HEALTH STABILITY: MENTAL HEALTH: CURRENT SMOKER: 0

## 2024-08-08 SDOH — HEALTH STABILITY: MENTAL HEALTH: WISH TO BE DEAD (PAST 1 MONTH): NO

## 2024-08-08 SDOH — SOCIAL STABILITY: SOCIAL INSECURITY: DO YOU FEEL ANYONE HAS EXPLOITED OR TAKEN ADVANTAGE OF YOU FINANCIALLY OR OF YOUR PERSONAL PROPERTY?: NO

## 2024-08-08 SDOH — ECONOMIC STABILITY: HOUSING INSECURITY: DO YOU FEEL UNSAFE GOING BACK TO THE PLACE WHERE YOU ARE LIVING?: NO

## 2024-08-08 SDOH — HEALTH STABILITY: MENTAL HEALTH: HAVE YOU USED ANY SUBSTANCES (CANABIS, COCAINE, HEROIN, HALLUCINOGENS, INHALANTS, ETC.) IN THE PAST 12 MONTHS?: NO

## 2024-08-08 SDOH — HEALTH STABILITY: MENTAL HEALTH: HAVE YOU USED ANY PRESCRIPTION DRUGS OTHER THAN PRESCRIBED IN THE PAST 12 MONTHS?: NO

## 2024-08-08 SDOH — SOCIAL STABILITY: SOCIAL INSECURITY: ABUSE SCREEN: ADULT

## 2024-08-08 SDOH — SOCIAL STABILITY: SOCIAL INSECURITY: ARE YOU OR HAVE YOU BEEN THREATENED OR ABUSED PHYSICALLY, EMOTIONALLY, OR SEXUALLY BY ANYONE?: NO

## 2024-08-08 SDOH — SOCIAL STABILITY: SOCIAL INSECURITY: HAVE YOU HAD THOUGHTS OF HARMING ANYONE ELSE?: NO

## 2024-08-08 SDOH — HEALTH STABILITY: MENTAL HEALTH: WERE YOU ABLE TO COMPLETE ALL THE BEHAVIORAL HEALTH SCREENINGS?: YES

## 2024-08-08 SDOH — SOCIAL STABILITY: SOCIAL INSECURITY: ARE THERE ANY APPARENT SIGNS OF INJURIES/BEHAVIORS THAT COULD BE RELATED TO ABUSE/NEGLECT?: NO

## 2024-08-08 SDOH — SOCIAL STABILITY: SOCIAL INSECURITY: HAVE YOU HAD ANY THOUGHTS OF HARMING ANYONE ELSE?: NO

## 2024-08-08 SDOH — HEALTH STABILITY: MENTAL HEALTH: NON-SPECIFIC ACTIVE SUICIDAL THOUGHTS (PAST 1 MONTH): NO

## 2024-08-08 SDOH — SOCIAL STABILITY: SOCIAL INSECURITY: VERBAL ABUSE: DENIES

## 2024-08-08 SDOH — SOCIAL STABILITY: SOCIAL INSECURITY: PHYSICAL ABUSE: DENIES

## 2024-08-08 SDOH — SOCIAL STABILITY: SOCIAL INSECURITY: DOES ANYONE TRY TO KEEP YOU FROM HAVING/CONTACTING OTHER FRIENDS OR DOING THINGS OUTSIDE YOUR HOME?: NO

## 2024-08-08 ASSESSMENT — LIFESTYLE VARIABLES
HOW OFTEN DO YOU HAVE 6 OR MORE DRINKS ON ONE OCCASION: NEVER
SKIP TO QUESTIONS 9-10: 1
AUDIT-C TOTAL SCORE: 0
AUDIT-C TOTAL SCORE: 0
HOW OFTEN DO YOU HAVE A DRINK CONTAINING ALCOHOL: NEVER
HOW MANY STANDARD DRINKS CONTAINING ALCOHOL DO YOU HAVE ON A TYPICAL DAY: PATIENT DOES NOT DRINK

## 2024-08-08 ASSESSMENT — PAIN SCALES - GENERAL
PAINLEVEL_OUTOF10: 0 - NO PAIN

## 2024-08-08 ASSESSMENT — PATIENT HEALTH QUESTIONNAIRE - PHQ9
2. FEELING DOWN, DEPRESSED OR HOPELESS: NOT AT ALL
1. LITTLE INTEREST OR PLEASURE IN DOING THINGS: NOT AT ALL
SUM OF ALL RESPONSES TO PHQ9 QUESTIONS 1 & 2: 0

## 2024-08-08 ASSESSMENT — ACTIVITIES OF DAILY LIVING (ADL): LACK_OF_TRANSPORTATION: NO

## 2024-08-08 NOTE — PROCEDURES
Parvez Russell, a  at 37w3d with an JOSELYN of 2024, by Ultrasound, was seen at [facility] for a nonstress test.    Non-Stress Test   Baseline Fetal Heart Rate for Non-Stress Test: 135 BPM  Variability in Waveform for Non-Stress Test: Moderate  Accelerations in Non-Stress Test: No  Decelerations in Non-Stress Test: None  Contractions in Non-Stress Test: Irregular  NST Contraction Frequency: one contraction in 20 minutes  Acoustic Stimulator for Non-Stress Test: Yes  Interpretation of Non-Stress Test   Interpretation of Non-Stress Test: (!) Non-reactive    Sent to L&D     Emma Duarte, DOUGLAS-CNM, APRN-CNP

## 2024-08-08 NOTE — H&P
Obstetrical Admission History and Physical     Parvez Russell is a 34 y.o.  at 37w3d. JOSELYN: 2024, by Ultrasound. Estimated fetal weight: 6lb. She has had prenatal care with Emma Duarte CNM .    Chief Complaint: IOL for suspected ICP     Assessment/Plan    35yo      IOL for suspected ICP  -NR NST In office  -tracing reassuring on admission Cat 1  -CRB and cytotec  -PPIUD    ICP  -pruritus started 30wga ()   -bile acids 2 at that time  -2nd drawn  still pending  -worsening symptoms today     Thrombocytopenia  -last plt 156  -admission pending  Class II obesity       Routine admission orders  Reviewed IOL and plan of care with M  Dr. Alvarez aware of patient and plan of care     Karoline COEL CNM      Principal Problem:    Intrahepatic cholestasis of pregnancy in third trimester      Pregnancy Problems (from 23 to present)       Problem Noted Resolved    37 weeks gestation of pregnancy ([facility]) 2024 by SIMI Monahan, APRN-CNP No    Priority:  Medium      Intrahepatic cholestasis of pregnancy in third trimester 2024 by SIMI Tom No    Priority:  Medium      36 weeks gestation of pregnancy ([facility]) 2024 by Wendy Quevedo, RN No    Priority:  Medium      Thrombocytopenia affecting pregnancy (Multi) 2024 by Wendy Quevedo, RN No    Priority:  Medium      Overview Signed 2024  2:05 PM by SIMI Monahan, APRN-CNP      143  7/ 154         Prenatal care, subsequent pregnancy in third trimester ([facility]) 2024 by Wendy Quevedo, RN No    Priority:  Medium      26 weeks gestation of pregnancy ([facility]) 2024 by SIMI Monahan, DOUGLAS-CNP No    Priority:  Medium      Urinary tract infection in mother during second trimester of pregnancy ([facility]) 2024 by Olivia Driver MD No    Priority:  Medium      Overview Addendum 2024  3:08 PM by SIMI Walden     E. faecalis  UTI in march s/p tx  WNL AMI - collected 4/25         Vaginal bleeding in pregnancy, second trimester ([facility]) 3/8/2024 by Bhargavi Fabian MD No    Priority:  Medium      Overview Signed 3/8/2024  4:04 PM by Bhargavi Fabian MD     BSUS: ,, cervical length: 37 mm  Mac imaging ordered         Supervision of other normal pregnancy, antepartum ([facility]) 1/25/2024 by SIMI Monahan, APRN-CNP No    Priority:  Medium      Overview Addendum 8/8/2024 11:25 AM by SIMI Monahan, APRN-CNP     Centering  Desired provider in labor: [x] CNM  [] Physician  [x] Dated by: 6w US  [x] Initial BMI: 36  [x] Prenatal Labs: WNL  [x] Rh status: O+  [x] Genetic Screening:  rr cf DNA  [x] Baby ASA: prescribed at 12w     [x] Anatomy US: 4/1  [x] Fetal Sex: Baby girl Vida  [] Patient added to BirthTracks  [x] 1hr GCT at 24-28wks: WNL  [] Fetal Surveillance (if indicated):    [x] Tdap (27-36wks): 6/20/24  [x] Flu Shot: 12/28/23    [] Breastfeeding:  [x] Pain management during labor: desires ncb   [x] Postpartum Birth control method: PPTL vs IUD   [] Labor Support:   [x] GBS at 36 wks: neg         Prenatal care, subsequent pregnancy in second trimester ([facility]) 1/25/2024 by SIMI Monahan APRN-CNP 4/1/2024 by Marybel Mahoney MD          Options for delivery have been discussed with the patient and she elects for an induction of labor.  Cervical ripening with cytotec, cervidil, other prostaglandin agents has been discussed.  Induction of labor with pitocin, amniotomy, cytotec, and cervical balloon have been discussed in detail. The risks, benefits, complications, alternatives, expected outcomes, potential problems during recuperation and recovery, and the risks of not performing the procedure were discussed with the patient. The patient stated understanding that the risks of delivery include, but are not limited to: death; reaction to medications; injury to bowel, bladder, ureters, uterus, cervix, vagina, and  other pelvic and abdominal structures, infection; blood loss and possible need for transfusion; and potential need for surgery, including hysterectomy. The risks of injury to the infant during delivery were also discussed. All questions were answered. There was concurrence with the planned procedure, and the patient wanted to proceed.    Admit to inpatient status. I anticipate that this patient will require a stay exceeding at least 2 midnights for delivery and postpartum.  Induction of labor.  Management of pregnancy complications, as indicated.    Subjective   Good fetal movement. Denies vaginal bleeding., Denies contractions., Denies leaking of fluid.       Reason for Induction of Labor:  Intrahepatic cholestasis of pregnancy     NR NST today in office      Obstetrical History   OB History    Para Term  AB Living   6 5 5     5   SAB IAB Ectopic Multiple Live Births           5      # Outcome Date GA Lbr Sean/2nd Weight Sex Type Anes PTL Lv   6 Current            5 Term 01/10/20    M Vag-Spont   CYNDY   4 Term 17    M Vag-Spont  N CYNDY   3 Term 14    M Vag-Spont  N CYNDY   2 Term 13    M Vag-Spont  N CYNDY   1 Term 10/18/06    M Vag-Spont   CYNDY       Past Medical History  Past Medical History:   Diagnosis Date    13 weeks gestation of pregnancy ([facility]) 2019    13 weeks gestation of pregnancy    2-part displaced fracture of surgical neck of unspecified humerus, initial encounter for closed fracture 10/29/2015    2-part displaced fracture of surgical neck of humerus    32 weeks gestation of pregnancy ([facility]) 2019    32 weeks gestation of pregnancy    Allergy status to unspecified drugs, medicaments and biological substances     History of seasonal allergies    Anemia complicating pregnancy, first trimester ([facility]) 10/14/2016    Antepartum anemia complicating pregnancy, first trimester    Anemia complicating pregnancy, third trimester ([facility]) 2019    Anemia affecting  pregnancy in third trimester    Chlamydial infection, unspecified     Chlamydia    Contusion of foot including toes, right, initial encounter 2024    Displaced fracture of shaft of right clavicle, initial encounter for closed fracture 2017    Closed displaced fracture of shaft of right clavicle, initial encounter    Encounter for contraceptive management, unspecified 2015    Encounter for contraceptive management    Encounter for full-term uncomplicated delivery ([facility])      (spontaneous vaginal delivery)    Encounter for immunization 2019    Need for Tdap vaccination    Encounter for immunization 2016    Need for Tdap vaccination    Encounter for screening for infections with a predominantly sexual mode of transmission 2016    Screen for STD (sexually transmitted disease)    Encounter for screening for malignant neoplasm of cervix 2019    Screening for cervical cancer    Encounter for supervision of normal pregnancy, unspecified, first trimester ([facility]) 2019    First trimester pregnancy    Encounter for supervision of normal pregnancy, unspecified, second trimester ([facility]) 10/22/2019    Second trimester pregnancy    Encounter for supervision of other normal pregnancy, third trimester ([facility]) 2020    Prenatal care, subsequent pregnancy, third trimester    Encounter for supervision of other normal pregnancy, third trimester ([facility]) 2019    Multigravida in third trimester    Encounter for supervision of other normal pregnancy, unspecified trimester ([facility]) 2019    Prenatal care, subsequent pregnancy    Encounter for supervision of other normal pregnancy, unspecified trimester ([facility]) 2014    Pregnancy, subsequent    Encounter for supervision of other normal pregnancy, unspecified trimester ([facility]) 2019    Encounter for supervision of normal pregnancy in multigravida    Encounter for surveillance of injectable contraceptive  08/20/2021    Encounter for Depo-Provera contraception    Fracture of left fibula 04/25/2024    Fracture of lumbar vertebra (Multi) 04/25/2024    Headache, unspecified 07/21/2013    Headache    Other conditions influencing health status 05/02/2016    Closed fracture of metacarpophalangeal (MCP) joint of right thumb with ligament tear    Other conditions influencing health status     History of pregnancy    Other problems related to lifestyle 07/12/2016    Other problems related to lifestyle    Pain in unspecified shoulder 10/30/2015    Shoulder pain    Personal history of (healed) traumatic fracture     History of fracture of clavicle    Personal history of diseases of the blood and blood-forming organs and certain disorders involving the immune mechanism 11/01/2016    History of thrombocytopenia    Personal history of diseases of the blood and blood-forming organs and certain disorders involving the immune mechanism     History of anemia    Personal history of nicotine dependence 07/21/2013    History of nicotine dependence    Personal history of other diseases of the female genital tract 07/21/2013    History of vaginitis    Personal history of other diseases of the female genital tract 07/12/2016    History of amenorrhea    Personal history of other diseases of the nervous system and sense organs 07/21/2013    Personal history of otitis media    Personal history of other infectious and parasitic diseases 07/28/2014    History of scabies    Personal history of other infectious and parasitic diseases     History of candidal vulvovaginitis    Personal history of other infectious and parasitic diseases     History of gonorrhea    Personal history of other specified conditions     History of abnormal Pap smear    Personal history of traumatic brain injury 09/19/2017    History of concussion    Personal history of urinary (tract) infections     Personal history of urinary tract infection        Past Surgical History    Past Surgical History:   Procedure Laterality Date    HAND SURGERY  07/12/2016    Hand Surgery                                                                                                                                                             Social History  Social History     Tobacco Use    Smoking status: Former     Types: Cigars    Smokeless tobacco: Never   Substance Use Topics    Alcohol use: Not Currently     Substance and Sexual Activity   Drug Use Not Currently       Allergies  Morphine     Medications  Medications Prior to Admission   Medication Sig Dispense Refill Last Dose    aspirin 81 mg chewable tablet Chew 2 tablets (162 mg) once daily. 60 tablet 11 Past Week    prenatal vitamin calcium-iron-folic (Prenatal Vitamin Plus Low Iron) 27 mg iron- 1 mg tablet Take 1 tablet by mouth once daily. 30 tablet 0 Past Week       Objective    Last Vitals  Temp Pulse Resp BP MAP O2 Sat   36.7 °C (98.1 °F) 93 18 111/72 85 97 %     Physical Examination  GENERAL: Examination reveals a well developed, well nourished, gravid female in no acute distress. She is alert and cooperative.  ABDOMEN: soft, gravid, nontender, nondistended, no abnormal masses, no epigastric pain  FHR is 144 , moderate variability, with Accelerations, and a Category I tracing.    [place] reading:  none palpated or tracing   The fetus is in a vertex presentation, determined by ultrasound and vaginal exam  Current Estimated Fetal Weight 6lb established by ultrasound  VAGINA: normal appearing vagina with normal color and discharge and no lesions noted  CERVIX: 1 cm dilated, 30 % effaced, -3 station; MEMBRANES are Intact  EXTREMITIES: no redness or tenderness in the calves or thighs, no edema  PSYCHOLOGICAL: awake and alert; oriented to person, place, and time    Lab Review  Lab Results   Component Value Date    WBC 7.6 08/08/2024    HGB 11.4 (L) 08/08/2024    HCT 37.2 08/08/2024     08/08/2024

## 2024-08-08 NOTE — PROGRESS NOTES
Subjective     Parvez Russell is a 34 y.o.  at 37w3d with a working estimated date of delivery of 2024, by Ultrasound who presents for a routine prenatal visit.     She denies vaginal bleeding, leakage of fluid, decreased fetal movements, or contractions.    Itching continues - daily. Rates 4/10, reports it is not worse, about the same.   CMP WNL, bile acids still pending from     Objective   Physical Exam:   Weight: 103 kg (228 lb)  Expected Total Weight Gain: 5 kg (11 lb)-9 kg (19 lb)   [place] BMI: 35.04  BP: 99/66 (HR 86)  Fetal Heart Rate: 138 Fundal Height (cm): 38 cm Presentation: Vertex  Dilation: 1 Effacement (%): 30 Fetal Station: -3    Postpartum Depression: Medium Risk (2024)    [place]  Depression Scale     Last EPDS Total Score: 5     Last EPDS Self Harm Result: Never        Problem List Items Addressed This Visit       Supervision of other normal pregnancy, antepartum ([facility]) - Primary    Overview     Centering  Desired provider in labor: [x] CNM  [] Physician  [x] Dated by: 6w US  [x] Initial BMI: 36  [x] Prenatal Labs: WNL  [x] Rh status: O+  [x] Genetic Screening:  rr cf DNA  [x] Baby ASA: prescribed at 12w     [x] Anatomy US:   [x] Fetal Sex: Baby girl Vida  [] Patient added to BirthTracks  [x] 1hr GCT at 24-28wks: WNL  [] Fetal Surveillance (if indicated):    [x] Tdap (27-36wks): 24  [x] Flu Shot: 23    [] Breastfeeding:  [x] Pain management during labor: desires ncb   [x] Postpartum Birth control method: PPTL vs IUD   [] Labor Support:   [x] GBS at 36 wks: neg         Pruritus of palm    Prenatal care, subsequent pregnancy in third trimester ([facility])    Class II obesity    Overview     BMI = 36.11 at NOB  Fetal surveillance BMI 35-39.9 at NOB:  Growth US at 30 and 36 wks  BPP or NST weekly 37 wks to delivery    Timing of delivery: 39 0/7 - 39 6/7 wks  *BMI >= 50 at any time in pregnancy: Deliver at Level 4           37 weeks  gestation of pregnancy ([facility])       Reviewed lab results GBS negative   Coping mechanisms and pain management options during labor discussed, open to see what happens - maybe NCB maybe epidural if she needs to be induced   Postpartum contraception options discussed, desires PP IUD   Reviewed concern for presumptive intrahepatic cholestasis of pregnancy with Dr. Lane - agrees with plan to send to L&D for IOL in the setting of presumed ICP   Discussed plan with patient and  who agree with plan   Report called to Ryan Latham CNM and charge RN     Emma Duarte, APRSHERI-NAWAFM, APRN-CNP

## 2024-08-08 NOTE — ANESTHESIA PREPROCEDURE EVALUATION
Patient: Parvez Russell    Evaluation Method: In-person visit    Procedure Information    Date: 08/08/24  Procedure: Labor Consult         Relevant Problems   /Renal   (+) Urinary tract infection in mother during second trimester of pregnancy ([facility])      Liver   (+) Intrahepatic cholestasis of pregnancy in third trimester      Hematology   (+) Thrombocytopenia affecting pregnancy (Multi)      ID   (+) Urinary tract infection in mother during second trimester of pregnancy ([facility])      GYN   (+) 26 weeks gestation of pregnancy ([facility])   (+) 36 weeks gestation of pregnancy ([facility])   (+) 37 weeks gestation of pregnancy ([facility])   (+) Supervision of other normal pregnancy, antepartum ([facility])       Clinical information reviewed:    Allergies  Meds  Problems              NPO Detail:  No data recorded     OB/Gyn Evaluation    Present Pregnancy    Patient is pregnant now.   Obstetric History      (-) difficult neuraxial anesthesia                Physical Exam    Airway  Mallampati: II  Neck ROM: full     Cardiovascular - normal exam     Dental        Pulmonary - normal exam     Abdominal - normal exam             Anesthesia Plan    History of general anesthesia?: yes  History of complications of general anesthesia?: no    ASA 2     epidural     The patient is not a current smoker.    Anesthetic plan and risks discussed with patient.  Use of blood products discussed with patient who consented to blood products.    Plan discussed with resident.

## 2024-08-09 PROCEDURE — 2500000004 HC RX 250 GENERAL PHARMACY W/ HCPCS (ALT 636 FOR OP/ED): Performed by: ADVANCED PRACTICE MIDWIFE

## 2024-08-09 PROCEDURE — 2500000005 HC RX 250 GENERAL PHARMACY W/O HCPCS: Performed by: ADVANCED PRACTICE MIDWIFE

## 2024-08-09 PROCEDURE — 2500000001 HC RX 250 WO HCPCS SELF ADMINISTERED DRUGS (ALT 637 FOR MEDICARE OP): Performed by: ADVANCED PRACTICE MIDWIFE

## 2024-08-09 PROCEDURE — 3700000014 EPIDURAL BLOCK: Mod: GC

## 2024-08-09 PROCEDURE — 1100000001 HC PRIVATE ROOM DAILY

## 2024-08-09 PROCEDURE — 2720000007 HC OR 272 NO HCPCS

## 2024-08-09 PROCEDURE — 59050 FETAL MONITOR W/REPORT: CPT

## 2024-08-09 PROCEDURE — 2500000004 HC RX 250 GENERAL PHARMACY W/ HCPCS (ALT 636 FOR OP/ED): Performed by: ANESTHESIOLOGIST ASSISTANT

## 2024-08-09 PROCEDURE — 2500000001 HC RX 250 WO HCPCS SELF ADMINISTERED DRUGS (ALT 637 FOR MEDICARE OP)

## 2024-08-09 PROCEDURE — 59409 OBSTETRICAL CARE: CPT | Performed by: ADVANCED PRACTICE MIDWIFE

## 2024-08-09 PROCEDURE — 7100000016 HC LABOR RECOVERY PER HOUR

## 2024-08-09 PROCEDURE — 2500000005 HC RX 250 GENERAL PHARMACY W/O HCPCS

## 2024-08-09 PROCEDURE — 51701 INSERT BLADDER CATHETER: CPT

## 2024-08-09 PROCEDURE — 7210000002 HC LABOR PER HOUR

## 2024-08-09 RX ORDER — LABETALOL HYDROCHLORIDE 5 MG/ML
20 INJECTION, SOLUTION INTRAVENOUS ONCE AS NEEDED
Status: DISCONTINUED | OUTPATIENT
Start: 2024-08-09 | End: 2024-08-11 | Stop reason: HOSPADM

## 2024-08-09 RX ORDER — SIMETHICONE 80 MG
80 TABLET,CHEWABLE ORAL 4 TIMES DAILY PRN
Status: DISCONTINUED | OUTPATIENT
Start: 2024-08-09 | End: 2024-08-11 | Stop reason: HOSPADM

## 2024-08-09 RX ORDER — BUPIVACAINE HYDROCHLORIDE 2.5 MG/ML
INJECTION, SOLUTION EPIDURAL; INFILTRATION; INTRACAUDAL AS NEEDED
Status: DISCONTINUED | OUTPATIENT
Start: 2024-08-09 | End: 2024-08-09

## 2024-08-09 RX ORDER — ONDANSETRON 4 MG/1
4 TABLET, FILM COATED ORAL EVERY 6 HOURS PRN
Status: DISCONTINUED | OUTPATIENT
Start: 2024-08-09 | End: 2024-08-11 | Stop reason: HOSPADM

## 2024-08-09 RX ORDER — HYDRALAZINE HYDROCHLORIDE 20 MG/ML
5 INJECTION INTRAMUSCULAR; INTRAVENOUS ONCE AS NEEDED
Status: DISCONTINUED | OUTPATIENT
Start: 2024-08-09 | End: 2024-08-11 | Stop reason: HOSPADM

## 2024-08-09 RX ORDER — LOPERAMIDE HYDROCHLORIDE 2 MG/1
4 CAPSULE ORAL EVERY 2 HOUR PRN
Status: DISCONTINUED | OUTPATIENT
Start: 2024-08-09 | End: 2024-08-11 | Stop reason: HOSPADM

## 2024-08-09 RX ORDER — ADHESIVE BANDAGE
10 BANDAGE TOPICAL
Status: DISCONTINUED | OUTPATIENT
Start: 2024-08-09 | End: 2024-08-11 | Stop reason: HOSPADM

## 2024-08-09 RX ORDER — BISACODYL 10 MG/1
10 SUPPOSITORY RECTAL DAILY PRN
Status: DISCONTINUED | OUTPATIENT
Start: 2024-08-09 | End: 2024-08-11 | Stop reason: HOSPADM

## 2024-08-09 RX ORDER — IBUPROFEN 600 MG/1
600 TABLET ORAL EVERY 6 HOURS
Status: DISCONTINUED | OUTPATIENT
Start: 2024-08-09 | End: 2024-08-11 | Stop reason: HOSPADM

## 2024-08-09 RX ORDER — OXYTOCIN 10 [USP'U]/ML
10 INJECTION, SOLUTION INTRAMUSCULAR; INTRAVENOUS ONCE AS NEEDED
Status: DISCONTINUED | OUTPATIENT
Start: 2024-08-09 | End: 2024-08-11 | Stop reason: HOSPADM

## 2024-08-09 RX ORDER — OXYTOCIN/0.9 % SODIUM CHLORIDE 30/500 ML
60 PLASTIC BAG, INJECTION (ML) INTRAVENOUS ONCE AS NEEDED
Status: DISCONTINUED | OUTPATIENT
Start: 2024-08-09 | End: 2024-08-11 | Stop reason: HOSPADM

## 2024-08-09 RX ORDER — DIPHENHYDRAMINE HCL 25 MG
25 CAPSULE ORAL EVERY 6 HOURS PRN
Status: DISCONTINUED | OUTPATIENT
Start: 2024-08-09 | End: 2024-08-11 | Stop reason: HOSPADM

## 2024-08-09 RX ORDER — FENTANYL/BUPIVACAINE/NS/PF 2MCG/ML-.1
PLASTIC BAG, INJECTION (ML) INJECTION AS NEEDED
Status: DISCONTINUED | OUTPATIENT
Start: 2024-08-09 | End: 2024-08-09

## 2024-08-09 RX ORDER — MISOPROSTOL 200 UG/1
800 TABLET ORAL ONCE AS NEEDED
Status: DISCONTINUED | OUTPATIENT
Start: 2024-08-09 | End: 2024-08-11 | Stop reason: HOSPADM

## 2024-08-09 RX ORDER — METHYLERGONOVINE MALEATE 0.2 MG/ML
0.2 INJECTION INTRAVENOUS ONCE AS NEEDED
Status: DISCONTINUED | OUTPATIENT
Start: 2024-08-09 | End: 2024-08-11 | Stop reason: HOSPADM

## 2024-08-09 RX ORDER — CARBOPROST TROMETHAMINE 250 UG/ML
250 INJECTION, SOLUTION INTRAMUSCULAR ONCE AS NEEDED
Status: DISCONTINUED | OUTPATIENT
Start: 2024-08-09 | End: 2024-08-11 | Stop reason: HOSPADM

## 2024-08-09 RX ORDER — DIPHENHYDRAMINE HYDROCHLORIDE 50 MG/ML
25 INJECTION INTRAMUSCULAR; INTRAVENOUS EVERY 6 HOURS PRN
Status: DISCONTINUED | OUTPATIENT
Start: 2024-08-09 | End: 2024-08-11 | Stop reason: HOSPADM

## 2024-08-09 RX ORDER — ONDANSETRON HYDROCHLORIDE 2 MG/ML
4 INJECTION, SOLUTION INTRAVENOUS EVERY 6 HOURS PRN
Status: DISCONTINUED | OUTPATIENT
Start: 2024-08-09 | End: 2024-08-11 | Stop reason: HOSPADM

## 2024-08-09 RX ORDER — METOCLOPRAMIDE 10 MG/1
10 TABLET ORAL ONCE
Status: COMPLETED | OUTPATIENT
Start: 2024-08-09 | End: 2024-08-09

## 2024-08-09 RX ORDER — LIDOCAINE 560 MG/1
1 PATCH PERCUTANEOUS; TOPICAL; TRANSDERMAL
Status: DISCONTINUED | OUTPATIENT
Start: 2024-08-09 | End: 2024-08-11 | Stop reason: HOSPADM

## 2024-08-09 RX ORDER — TRANEXAMIC ACID 100 MG/ML
1000 INJECTION, SOLUTION INTRAVENOUS ONCE AS NEEDED
Status: DISCONTINUED | OUTPATIENT
Start: 2024-08-09 | End: 2024-08-11 | Stop reason: HOSPADM

## 2024-08-09 RX ORDER — ENOXAPARIN SODIUM 100 MG/ML
40 INJECTION SUBCUTANEOUS EVERY 24 HOURS
Status: DISCONTINUED | OUTPATIENT
Start: 2024-08-09 | End: 2024-08-11 | Stop reason: HOSPADM

## 2024-08-09 RX ORDER — ACETAMINOPHEN 325 MG/1
975 TABLET ORAL EVERY 6 HOURS
Status: DISCONTINUED | OUTPATIENT
Start: 2024-08-09 | End: 2024-08-11 | Stop reason: HOSPADM

## 2024-08-09 RX ORDER — NIFEDIPINE 10 MG/1
10 CAPSULE ORAL ONCE AS NEEDED
Status: DISCONTINUED | OUTPATIENT
Start: 2024-08-09 | End: 2024-08-11 | Stop reason: HOSPADM

## 2024-08-09 RX ORDER — POLYETHYLENE GLYCOL 3350 17 G/17G
17 POWDER, FOR SOLUTION ORAL 2 TIMES DAILY PRN
Status: DISCONTINUED | OUTPATIENT
Start: 2024-08-09 | End: 2024-08-11 | Stop reason: HOSPADM

## 2024-08-09 ASSESSMENT — PAIN SCALES - GENERAL
PAINLEVEL_OUTOF10: 7
PAINLEVEL_OUTOF10: 7
PAINLEVEL_OUTOF10: 8
PAINLEVEL_OUTOF10: 6
PAINLEVEL_OUTOF10: 5 - MODERATE PAIN
PAINLEVEL_OUTOF10: 5 - MODERATE PAIN
PAINLEVEL_OUTOF10: 4
PAINLEVEL_OUTOF10: 0 - NO PAIN

## 2024-08-09 ASSESSMENT — PAIN DESCRIPTION - DESCRIPTORS
DESCRIPTORS: DISCOMFORT
DESCRIPTORS: CRAMPING;DISCOMFORT
DESCRIPTORS: CRAMPING
DESCRIPTORS: DISCOMFORT
DESCRIPTORS: ACHING

## 2024-08-09 ASSESSMENT — PAIN SCALES - PAIN ASSESSMENT IN ADVANCED DEMENTIA (PAINAD): TOTALSCORE: MEDICATION (SEE MAR)

## 2024-08-09 ASSESSMENT — PAIN DESCRIPTION - LOCATION: LOCATION: BACK

## 2024-08-09 NOTE — ANESTHESIA PROCEDURE NOTES
Epidural Block    Patient location during procedure: OB  Start time: 8/8/2024 11:53 PM  End time: 8/9/2024 12:47 AM  Reason for block: labor analgesia  Staffing  Performed: resident   Authorized by: Shaun Barrera DO    Performed by: Lama Guille MD    Preanesthetic Checklist  Completed: patient identified, IV checked, risks and benefits discussed, surgical consent, pre-op evaluation, timeout performed and sterile techniques followed  Block Timeout  RN/Licensed healthcare professional reads aloud to the Anesthesia provider and entire team: Patient identity, procedure with side and site, patient position, and as applicable the availability of implants/special equipment/special requirements.  Patient on coagulant treatment: no  Timeout performed at: 8/8/2024 11:54 PM  Block Placement  Patient position: sitting  Prep: ChloraPrep  Sterility prep: cap, gloves, hand and mask  Sedation level: no sedation  Patient monitoring: heart rate, continuous pulse oximetry and blood pressure  Approach: midline  Local numbing: lidocaine 1% to skin and subcutaneous tissues  Vertebral space: lumbar  Lumbar location: L3-L4  Epidural  Loss of resistance technique: saline  Guidance: landmark technique        Needle  Needle type: Tuohy   Needle gauge: 18  Needle length: 11.4 cm  Needle insertion depth: 6 cm  Catheter type: multi-orifice  Catheter size: 20 G  Catheter at skin depth: 11 cm  Catheter securement method: clear occlusive dressing, liquid medical adhesive and surgical tape    Test dose: lidocaine 1.5% with epinephrine 1-to-200,000  Test dose: lidocaine 1.5% with epinephrine 1-to-200,000  Test dose result: no positive test dose    PCEA  Medication concentration used: 0.044% Bupivacaine with 1.25 mcg/mL Fentanyl and [id number] Epinephrine  Dose (mL): 10  Lockout (minutes): 15  1-Hour Limit (boluses/hr): 4  Basal Rate: 14        Assessment  Sensory level: T10 bilateral  Block outcome: pain improved  Number of attempts: 3 or  more  Events: no positive test dose  Procedure assessment: patient tolerated procedure well with no immediate complications

## 2024-08-09 NOTE — ANESTHESIA PREPROCEDURE EVALUATION
Patient: Parvez Russell    Evaluation Method: In-person visit    Procedure Information    Date: 08/08/24  Procedure: Labor Consult         Relevant Problems   /Renal   (+) Urinary tract infection in mother during second trimester of pregnancy ([facility])      Liver   (+) Intrahepatic cholestasis of pregnancy in third trimester      Hematology   (+) Thrombocytopenia affecting pregnancy (Multi)      ID   (+) Urinary tract infection in mother during second trimester of pregnancy ([facility])      GYN   (+) 26 weeks gestation of pregnancy ([facility])   (+) 36 weeks gestation of pregnancy ([facility])   (+) 37 weeks gestation of pregnancy ([facility])   (+) Supervision of other normal pregnancy, antepartum ([facility])       Clinical information reviewed:    Allergies  Meds  Problems              NPO Detail:  No data recorded     OB/Gyn Evaluation    Present Pregnancy    Patient is pregnant now.   Obstetric History      (-) difficult neuraxial anesthesia                Physical Exam    Airway  Mallampati: II  Neck ROM: full     Cardiovascular - normal exam     Dental        Pulmonary - normal exam     Abdominal - normal exam             Anesthesia Plan    History of general anesthesia?: yes  History of complications of general anesthesia?: no    ASA 2     epidural     The patient is not a current smoker.    Anesthetic plan and risks discussed with patient.  Use of blood products discussed with patient who consented to blood products.    Plan discussed with resident.

## 2024-08-09 NOTE — DISCHARGE INSTRUCTIONS

## 2024-08-09 NOTE — L&D DELIVERY NOTE
OB Delivery Note  2024  Parvez Russell  34 y.o.   Vaginal, Spontaneous       Gestational Age: 37w4d  /Para:   Quantitative Blood Loss: Admission to Discharge: 100 mL (2024  3:31 PM - 2024  7:35 AM)    Dominick Russell [[id number]]      Labor Events    Sac identifier: Sac 1  Rupture date/time: 2024 230  Rupture type: Artificial  Fluid color: Clear  Fluid odor: None  Labor type: Induced Onset of Labor  Labor allowed to proceed with plans for an attempted vaginal birth?: Yes  Induction: Misoprostol, Henry/EASI, Oxytocin, AROM  First cervical ripening date/time: 2024  Induction date/time: 2024  Induction indications: Other  Complications: None       Labor Event Times    Labor onset date/time: 2024  Dilation complete date/time: 2024  Start pushing date/time: 2024       Labor Length    1st stage: 9h 36m  2nd stage: 0h 00m  3rd stage: 0h 10m       Placenta    Placenta delivery date/time: 2024  Placenta removal: Spontaneous  Placenta appearance: Intact  Placenta disposition: pathology       Cord    Vessels: 3 vessels  Complications: None  Delayed cord clamping?: Yes  Gases sent?: No  Stem cell collection (by provider): No       Lacerations    Episiotomy: None  Perineal laceration: None  Other lacerations?: No  Repair suture: None       Anesthesia    Method: Epidural       Operative Delivery    Forceps attempted?: No  Vacuum extractor attempted?: No       Shoulder Dystocia    Shoulder dystocia present?: No       Lysite Delivery    Birth date/time: 2024 04:14:00  Delivery type: Vaginal, Spontaneous  Complications: None       Resuscitation    Method: Suctioning, Tactile stimulation       Apgars    Living status: Living  Apgar Component Scores:  1 min.:  5 min.:  10 min.:  15 min.:  20 min.:    Skin color:  0  1       Heart rate:  2  2       Reflex irritability:  2  2       Muscle tone:  2  2       Respiratory effort:  2   2       Total:  8  9       Apgars assigned by: GREG MCKEE       Delivery Providers    Delivering clinician: SIMI Meza   Provider Role    Demetri Pennington, RN Delivery Nurse    Asia Ann, RN Nursery Nurse                     Renae Butt, SIMI

## 2024-08-09 NOTE — PROGRESS NOTES
Pt sitting comfortably in bed, not really feeling ctx anymore. CRB came out at ready for sve and possible arom.  , mod variability, +accels, no decels.  [place] q2-5 min.  SVE 3/50/-2, arom'd for clear fluid.  Will start pit per protocol.  Anticipate svb.    Renae Butt CNM

## 2024-08-09 NOTE — PROGRESS NOTES
Called to room by RN d/t pt feeling urge to push.  , mod variability, -accels, -decels.  [place] 1.5-3 min.  Pit decreased from 4 mu to 2 d/t tachysystole.  SVE 4/70/-2.  Continue current course.  Anticipate svb.    Reane MCCRACKEN

## 2024-08-09 NOTE — PROGRESS NOTES
Pt sitting up in bed, slightly uncomfortable with ctx from CRB but doing ok.   , mod variability, +accels, no decels.  [place] q2-6 min.  SVE deferred.  Continue current course.  Pain management as needed.  Anticipate svb.    Renae Butt CNM

## 2024-08-09 NOTE — PROGRESS NOTES
Social Work Assessment       Patient: Parvez Russell   Address: [address] [place] [address]  Phone: [phone]    Referral Reason: Assistance needed with car seat, and safe sleep location     Prenatal Care: Yes     Name: Denzel   [place] : 24    Other Children: Yes, 1/10/20(M), 17(M), 14(M), 4/3/13(M), 10/18/06(M)    Household Composition: Lives in stable housing with dependent children     IPV/DV or Safety Concerns: Denies     Car-Seat: Yes- family member purchased car seat   Safe Sleep Space: Yes-pack n' play per patient   Safe Sleep Education: Yes     Transportation Concerns: Denies     School/Work/Income: 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  Patient was friendly, and easy to engage in conversation.  She reports living in stable housing with dependent children.  No safety concerns reported.  Patient reports having supplies needed to care for  including car seat, and safe sleep location.  Ms. Russell verbalized understanding of the ABC's of safe sleep.  Household receives SNAP benefits.  She plans to apply for WIC.  Patient denies mental health history.  Sign/symptoms of PPD reviewed.  Ms. Russell denies having any unmet needs at this time     Plan: Ms. Russell MRN:[id number], and  girl Denzel MRN:[id number], are appropriate for discharge from SW perspective       Signature: [facility]

## 2024-08-09 NOTE — PROGRESS NOTES
Pt resting comfortably with epidural.  , mod variability, -accels, -decels.  [place] q 1.5-6 min  SVE 4/70/-2.  RN to initiate pit per protocol now.  Anticipate svb.    Renae Butt CNM

## 2024-08-10 ENCOUNTER — ANESTHESIA (OUTPATIENT)
Dept: OBSTETRICS AND GYNECOLOGY | Facility: HOSPITAL | Age: 34
End: 2024-08-10
Payer: COMMERCIAL

## 2024-08-10 ENCOUNTER — ANESTHESIA EVENT (OUTPATIENT)
Dept: OBSTETRICS AND GYNECOLOGY | Facility: HOSPITAL | Age: 34
End: 2024-08-10
Payer: COMMERCIAL

## 2024-08-10 PROBLEM — O23.42 URINARY TRACT INFECTION IN MOTHER DURING SECOND TRIMESTER OF PREGNANCY (HHS-HCC): Status: RESOLVED | Noted: 2024-04-25 | Resolved: 2024-08-10

## 2024-08-10 PROBLEM — Z3A.26 26 WEEKS GESTATION OF PREGNANCY (HHS-HCC): Status: RESOLVED | Noted: 2024-05-23 | Resolved: 2024-08-10

## 2024-08-10 PROBLEM — Z3A.36 36 WEEKS GESTATION OF PREGNANCY (HHS-HCC): Status: RESOLVED | Noted: 2024-06-20 | Resolved: 2024-08-10

## 2024-08-10 PROBLEM — Z3A.37 37 WEEKS GESTATION OF PREGNANCY (HHS-HCC): Status: RESOLVED | Noted: 2024-08-08 | Resolved: 2024-08-10

## 2024-08-10 PROBLEM — O46.92 VAGINAL BLEEDING IN PREGNANCY, SECOND TRIMESTER (HHS-HCC): Status: RESOLVED | Noted: 2024-03-08 | Resolved: 2024-08-10

## 2024-08-10 PROCEDURE — 2500000005 HC RX 250 GENERAL PHARMACY W/O HCPCS: Performed by: ADVANCED PRACTICE MIDWIFE

## 2024-08-10 PROCEDURE — 62273 INJECT EPIDURAL PATCH: CPT | Mod: GC

## 2024-08-10 PROCEDURE — 1100000001 HC PRIVATE ROOM DAILY

## 2024-08-10 PROCEDURE — 3E0R3GC INTRODUCTION OF OTHER THERAPEUTIC SUBSTANCE INTO SPINAL CANAL, PERCUTANEOUS APPROACH: ICD-10-PCS

## 2024-08-10 PROCEDURE — 2500000001 HC RX 250 WO HCPCS SELF ADMINISTERED DRUGS (ALT 637 FOR MEDICARE OP): Performed by: ADVANCED PRACTICE MIDWIFE

## 2024-08-10 RX ORDER — METOCLOPRAMIDE 10 MG/1
10 TABLET ORAL EVERY 6 HOURS PRN
Status: DISCONTINUED | OUTPATIENT
Start: 2024-08-10 | End: 2024-08-11 | Stop reason: HOSPADM

## 2024-08-10 RX ORDER — MEDROXYPROGESTERONE ACETATE 150 MG/ML
150 INJECTION, SUSPENSION INTRAMUSCULAR ONCE
Status: DISCONTINUED | OUTPATIENT
Start: 2024-08-10 | End: 2024-08-10

## 2024-08-10 RX ORDER — METOCLOPRAMIDE HYDROCHLORIDE 5 MG/ML
10 INJECTION INTRAMUSCULAR; INTRAVENOUS EVERY 6 HOURS PRN
Status: DISCONTINUED | OUTPATIENT
Start: 2024-08-10 | End: 2024-08-11 | Stop reason: HOSPADM

## 2024-08-10 RX ORDER — MEDROXYPROGESTERONE ACETATE 150 MG/ML
150 INJECTION, SUSPENSION INTRAMUSCULAR ONCE AS NEEDED
Status: COMPLETED | OUTPATIENT
Start: 2024-08-10 | End: 2024-08-11

## 2024-08-10 ASSESSMENT — PAIN SCALES - GENERAL
PAINLEVEL_OUTOF10: 8
PAINLEVEL_OUTOF10: 6
PAINLEVEL_OUTOF10: 7
PAINLEVEL_OUTOF10: 0 - NO PAIN
PAINLEVEL_OUTOF10: 7
PAINLEVEL_OUTOF10: 8
PAINLEVEL_OUTOF10: 0 - NO PAIN
PAINLEVEL_OUTOF10: 0 - NO PAIN
PAINLEVEL_OUTOF10: 7
PAINLEVEL_OUTOF10: 0 - NO PAIN

## 2024-08-10 ASSESSMENT — PAIN DESCRIPTION - LOCATION
LOCATION: BACK
LOCATION: BACK

## 2024-08-10 ASSESSMENT — PAIN DESCRIPTION - DESCRIPTORS: DESCRIPTORS: SORE

## 2024-08-10 NOTE — ANESTHESIA POSTPROCEDURE EVALUATION
Patient: Parvez Russell    Procedure Summary       Date: 08/09/24 Room / Location:     Anesthesia Start: 0025 Anesthesia Stop: 0414    Procedure: Labor Analgesia Diagnosis:     Scheduled Providers:  Responsible Provider: Shaun Barrera DO    Anesthesia Type: epidural ASA Status: 2            Anesthesia Type: epidural    Vitals Value Taken Time   /67 08/10/24 0725   Temp 36 C 08/10/24 0725   Pulse 73 08/10/24 0725   Resp 16 08/10/24 0725   SpO2 96% 08/10/24 0725       Anesthesia Post Evaluation    Patient location during evaluation: floor  Patient participation: complete - patient participated  Level of consciousness: awake and alert  Pain management: adequate  Multimodal analgesia pain management approach  Airway patency: patent  Cardiovascular status: hemodynamically stable  Respiratory status: room air and acceptable  Hydration status: acceptable  Postoperative Nausea and Vomiting: none    Post partum day 1 after vaginal delivery. Had epidural placed 8/8 (difficult placement, >3 attempts). Noticed slight headache when transferred to Mac 3 but was still laying in bed. Got up to chair yesterday afternoon and noticed severe HA which was improved when she laid flat. She has had a PDPH in the past and states this is the same as that time. She also notes back pain with some pains that radiate down her legs. No nausea or vomiting, no weakness, numbness or tingling in the legs. Epidural site was checked. No swelling, erythema, warmth or drainage noted.    She plans to get up today and see how her headache feels then will decide if she would like to discuss a blood patch. We did discuss a trial of fluids and caffeine as conservative measures in the meantime. Pt agreeable to this plan.      No notable events documented.

## 2024-08-10 NOTE — ANESTHESIA PROCEDURE NOTES
Blood Patch:  Patient location during procedure: Floor  Start time: 8/10/2024 2:02 PM  End time: 8/10/2024 2:15 PM  Reason for Blood Patch: postdural puncture headache  Staffing  Performed: attending and resident   Authorized by: Yair Dennison DO    Performed by: Yair Dennison DO    Preanesthetic Checklist  Completed: patient identified, IV checked, site marked, risks and benefits discussed, surgical consent, pre-op evaluation, timeout performed and anesthesia consent given  Block Timeout  RN/Licensed healthcare professional reads aloud to the Anesthesia provider and entire team: Patient identity, procedure with side and site, patient position, and as applicable the availability of implants/special equipment/special requirements.  Patient on coagulant treatment: yes (last daily prophylactic lovenox > 12h prior to start of procedure)  Timeout performed at: 8/10/2024 2:02 PM  Epidural  Patient position: sitting  Prep: Chloraprep  Sterility prep: cap, drape, gloves, hand and mask  Patient monitoring: blood pressure, continuous pulse oximetry and heart rate  Approach: midline  Local numbing: lidocaine 1% to skin and subcutaneous tissues  Location: L2-3  Loss of resistance technique: saline  Guidance: landmarks    Needle type: Tuohy   Needle gauge: 17  Needle length: 8.9 cm cm  Needle insertion depth: 7 cm  Blood Patch  Location of venous blood draw: left arm  Injection method: epidural needle  Volume of blood injected: 20 mL  Assessment   Number of attempts: 1  Events: well tolerated  Procedure assessment: patient tolerated procedure well with no immediate complications and patient in supine position for 30-60 minutes after procedure  Postprocedure Assessment  Patient position: sitting up  Headache: resolved  Patient will be discharged to home: patient not discharged home  Patient counseled on signs/symptoms for which to call and/or return: yes  Postprocedure instructions for plan/follow-up given to patient:  yes

## 2024-08-10 NOTE — CARE PLAN
The patient's goals for the shift include to rest.    The clinical goals for the shift include Pt will have stable vitals throughout shift.      Problem: Postpartum  Goal: Experiences normal postpartum course  Outcome: Progressing  Goal: Appropriate maternal -  bonding  Outcome: Progressing  Goal: Establish and maintain infant feeding pattern for adequate nutrition  Outcome: Progressing  Goal: Incisions, wounds, or drain sites healing without S/S of infection  Outcome: Progressing  Goal: No s/sx infection  Outcome: Progressing  Goal: No s/sx of hemorrhage  Outcome: Progressing  Goal: Minimal s/sx of HDP and BP<160/110  Outcome: Progressing

## 2024-08-10 NOTE — ANESTHESIA PREPROCEDURE EVALUATION
Patient: Parvez Russell    Evaluation Method: In-person visit    Procedure Information    Date: 08/10/24  Procedure: Epidural blood patch         Relevant Problems   /Renal   (+) Urinary tract infection in mother during second trimester of pregnancy (HHS-HCC)      Liver   (+) Intrahepatic cholestasis of pregnancy in third trimester      Hematology   (+) Thrombocytopenia affecting pregnancy (Multi)      ID   (+) Urinary tract infection in mother during second trimester of pregnancy (HHS-HCC)      GYN   (+) 26 weeks gestation of pregnancy (HHS-HCC)   (+) 36 weeks gestation of pregnancy (HHS-HCC)   (+) 37 weeks gestation of pregnancy (HHS-HCC)   (+) Supervision of other normal pregnancy, antepartum (HHS-HCC)     Last dose of Lovenox ~2000 on 8/9/24    Clinical information reviewed:    Allergies  Meds  Problems              NPO Detail:  No data recorded     OB/Gyn Evaluation    Present Pregnancy    Patient is pregnant now.   Obstetric History      (-) difficult neuraxial anesthesia                Physical Exam    Airway  Mallampati: II  Neck ROM: full     Cardiovascular - normal exam     Dental        Pulmonary - normal exam     Abdominal - normal exam         Vitals:    08/10/24 0725   BP: 107/67   Pulse: 73   Resp: 16   Temp: 36 °C (96.8 °F)   SpO2: 96%       Chemistry    Lab Results   Component Value Date/Time     (L) 08/01/2024 1343    K 4.0 08/01/2024 1343     08/01/2024 1343    CO2 20 (L) 08/01/2024 1343    BUN 8 08/01/2024 1343    CREATININE 0.57 08/01/2024 1343    Lab Results   Component Value Date/Time    CALCIUM 8.9 08/01/2024 1343    ALKPHOS 78 08/01/2024 1343    AST 10 08/01/2024 1343    ALT 5 (L) 08/01/2024 1343    BILITOT 0.4 08/01/2024 1343          Lab Results   Component Value Date/Time    WBC 7.6 08/08/2024 1635    HGB 11.4 (L) 08/08/2024 1635    HCT 37.2 08/08/2024 1635     08/08/2024 1635     Lab Results   Component Value Date/Time    PROTIME 10.1 03/18/2024 1830     INR 0.9 03/18/2024 1830     No results found for this or any previous visit (from the past 4464 hour(s)).  No results found for this or any previous visit from the past 1095 days.    Hx of difficult epidural placement. Hx of prior post dural puncture headache s/p successful epidural blood patch.      Anesthesia Plan    History of general anesthesia?: yes  History of complications of general anesthesia?: no    ASA 2     Epidural blood patch    The patient is not a current smoker.    Anesthetic Procedure/plan and risks discussed with patient.  Use of blood products discussed with patient who consented to blood products.    Plan discussed with resident, attending.

## 2024-08-10 NOTE — PROGRESS NOTES
Postpartum Progress Note  Subjective   Parvez Russell is a 34 y.o., , who had an  on 24 at 37w4d gestation, now PPD #1.   Hospital course: no complications  The patient's blood type is O POS. The baby's blood type is A POS. Rhogam is not indicated.    Patient believes that she has a spinal headache and is also having back pain. She reports that when laying down she has no headache, but when sitting up/standing/walking she has one. She reports a hx of a spinal headache in the past. She also reports back pain at her epidural site. She states that she was poked multiple times during the epidural placement. Tylenol and motrin don't help with headache, though they do help with the rest of her pain.     She is urinating and passing flatus without difficulty   She is ambulating well  She is tolerating a Adult diet Regular  She reports no breast or nursing problems; patient is bottlefeeding only   She denies emotional concerns today   Her plan for contraception is IUD at PP visit, she would like Depo before discharge as a bridge.     Pregnancy Problems (from 23 to present)       Problem Noted Resolved    Intrahepatic cholestasis of pregnancy in third trimester 2024 by SIMI Tom No    Priority:  Medium      Thrombocytopenia affecting pregnancy (Multi) 2024 by Wendy Quevedo, RYLEE No    Priority:  Medium      Overview Signed 2024  2:05 PM by SIMI Monahan, APRN-CNP      143  7/ 154          Objective   Last Vitals:  Temp Pulse Resp BP MAP Pulse Ox   36 °C (96.8 °F) 89 17 100/65   95 %     Physical Exam:  General: Examination reveals a well developed, well nourished, female, in no acute distress. She is alert and cooperative.  HEENT: External ears normal. Nose normal, no erythema or discharge.  Neck: supple, no significant adenopathy.  Lungs: breathing even and unlabored   Back: minimal tenderness to lumbar area, epidural insertion site visible  near tenderness, no erythema   Cardiac: warm and well perfused .  Fundus: firm and below umbilicus. Lochia light  Extremities: no redness or tenderness in the calves or thighs, no edema.  Neurological: alert, oriented, normal speech, no focal findings or movement disorder noted.  Psychological: awake and alert; oriented to person, place, and time.  Skin: no rashes or lesions    Lab Data:  Lab Results   Component Value Date    ABO O 2024    LABRH POS 2024    ABSCRN NEG 2024     Lab Results   Component Value Date    WBC 7.6 2024    HGB 11.4 (L) 2024    HCT 37.2 2024     2024     Assessment/Plan   PPD#1 s/p Vaginal, Spontaneous on 2024  - continue routine postpartum care  - pain controlled on po medications  - anesthesia up to evaluate patient and headache, please see their plan of care   - dvt risk score DVT Score: 5 , ppx with Lovenox 40 mg daily while admitted  - Hgb:   Results from last 7 days   Lab Units 24  1635   HEMOGLOBIN g/dL 11.4*    - Patient is Rh Positive (Rh+). Not eligible    Maternal Well-Being  - emotional support provided    [place] Feeding  - Formula feeding preferred by mom/patient  - discussed cool compresses, supportive bras in case she needs     Contraception  - patient will like copper IUD  - Depo ordered to be given before discharge     Dispo  - anticipate d/c on PPD #2 if meeting all postpartum milestones  -Follow up in 4-6 wk for postpartum visit with your OB provider.     Trina Aguirre, S-NM     I was present with the APRN student who participated in the documentation of this note. I have personally seen and re-examined the patient and performed the medical decision-making components (assessment and plan of care). I have reviewed the APRN student documentation and verified the findings in the note as written with additions or exceptions as stated in the body of this note.    DOUGLAS Walden-MARCO

## 2024-08-10 NOTE — ANESTHESIA POSTPROCEDURE EVALUATION
Patient: Parvez Russell    Procedure Summary       Date: 08/10/24 Room / Location:     Anesthesia Start: 1400 Anesthesia Stop: 1545    Procedure: Epidural Blood Patch Diagnosis:     Scheduled Providers:  Responsible Provider: Maria L Fraga DO    Anesthesia Type: Not recorded ASA Status: Not recorded            Anesthesia Type: No value filed.    Vitals Value Taken Time   /74 08/10/24 1554   Temp Not recorded 08/10/24 1554   Pulse 64 08/10/24 1554   Resp 18 08/10/24 1554   SpO2 96% 08/10/24 1554       Anesthesia Post Evaluation    Patient location during evaluation: floor  Patient participation: complete - patient participated  Level of consciousness: awake and alert  Pain management: adequate  Multimodal analgesia pain management approach  Airway patency: patent  Cardiovascular status: hemodynamically stable  Respiratory status: room air and acceptable  Hydration status: acceptable  Postoperative Nausea and Vomiting: none        No notable events documented.

## 2024-08-11 ENCOUNTER — PHARMACY VISIT (OUTPATIENT)
Dept: PHARMACY | Facility: CLINIC | Age: 34
End: 2024-08-11
Payer: MEDICAID

## 2024-08-11 VITALS
OXYGEN SATURATION: 94 % | TEMPERATURE: 98.2 F | RESPIRATION RATE: 17 BRPM | DIASTOLIC BLOOD PRESSURE: 65 MMHG | SYSTOLIC BLOOD PRESSURE: 98 MMHG | HEART RATE: 70 BPM | BODY MASS INDEX: 36.6 KG/M2 | WEIGHT: 227.74 LBS | HEIGHT: 66 IN

## 2024-08-11 PROBLEM — O99.119 THROMBOCYTOPENIA AFFECTING PREGNANCY (MULTI): Status: RESOLVED | Noted: 2024-06-20 | Resolved: 2024-08-11

## 2024-08-11 PROBLEM — O26.643 INTRAHEPATIC CHOLESTASIS OF PREGNANCY IN THIRD TRIMESTER: Status: RESOLVED | Noted: 2024-08-08 | Resolved: 2024-08-11

## 2024-08-11 PROBLEM — D69.6 THROMBOCYTOPENIA AFFECTING PREGNANCY (MULTI): Status: RESOLVED | Noted: 2024-06-20 | Resolved: 2024-08-11

## 2024-08-11 PROBLEM — Z34.80 SUPERVISION OF OTHER NORMAL PREGNANCY, ANTEPARTUM (HHS-HCC): Status: RESOLVED | Noted: 2024-01-25 | Resolved: 2024-08-11

## 2024-08-11 PROCEDURE — 2500000001 HC RX 250 WO HCPCS SELF ADMINISTERED DRUGS (ALT 637 FOR MEDICARE OP): Performed by: ADVANCED PRACTICE MIDWIFE

## 2024-08-11 PROCEDURE — 2500000004 HC RX 250 GENERAL PHARMACY W/ HCPCS (ALT 636 FOR OP/ED): Performed by: ADVANCED PRACTICE MIDWIFE

## 2024-08-11 PROCEDURE — RXMED WILLOW AMBULATORY MEDICATION CHARGE

## 2024-08-11 RX ORDER — MEDROXYPROGESTERONE ACETATE 150 MG/ML
150 INJECTION, SUSPENSION INTRAMUSCULAR
COMMUNITY
Start: 2024-08-11

## 2024-08-11 RX ORDER — IBUPROFEN 600 MG/1
600 TABLET ORAL EVERY 6 HOURS PRN
Qty: 30 TABLET | Refills: 1 | Status: SHIPPED | OUTPATIENT
Start: 2024-08-11 | End: 2024-08-11

## 2024-08-11 RX ORDER — ACETAMINOPHEN 500 MG
1000 TABLET ORAL EVERY 6 HOURS PRN
Qty: 60 TABLET | Refills: 1 | Status: SHIPPED | OUTPATIENT
Start: 2024-08-11 | End: 2024-08-11

## 2024-08-11 RX ORDER — ACETAMINOPHEN 500 MG
1000 TABLET ORAL EVERY 6 HOURS PRN
Qty: 60 TABLET | Refills: 1 | Status: SHIPPED | OUTPATIENT
Start: 2024-08-11

## 2024-08-11 RX ORDER — IBUPROFEN 600 MG/1
600 TABLET ORAL EVERY 6 HOURS PRN
Qty: 30 TABLET | Refills: 1 | Status: SHIPPED | OUTPATIENT
Start: 2024-08-11

## 2024-08-11 ASSESSMENT — PAIN DESCRIPTION - LOCATION
LOCATION: BACK
LOCATION: BACK

## 2024-08-11 ASSESSMENT — PAIN DESCRIPTION - DESCRIPTORS: DESCRIPTORS: SORE

## 2024-08-11 ASSESSMENT — PAIN SCALES - PAIN ASSESSMENT IN ADVANCED DEMENTIA (PAINAD): TOTALSCORE: MEDICATION (SEE MAR)

## 2024-08-11 ASSESSMENT — PAIN SCALES - GENERAL
PAINLEVEL_OUTOF10: 5 - MODERATE PAIN
PAINLEVEL_OUTOF10: 4
PAINLEVEL_OUTOF10: 4

## 2024-08-11 NOTE — CARE PLAN
Goal: No s/sx of infection through recovery  2024 0608 by Rosalina Arellano RN  Outcome: Progressing  2024 0608 by Rosalina Arellano RN  Outcome: Progressing  Goal: No s/sx of hemorrhage through recovery  2024 0608 by Rosalina Arellano RN  Outcome: Progressing  2024 0608 by Rosalina Arellano RN  Outcome: Progressing     Problem: Pain - Adult  Goal: Verbalizes/displays adequate comfort level or baseline comfort level  2024 0608 by Rosalina Arellano RN  Outcome: Progressing  2024 0608 by Rosalina Arellano RN  Outcome: Progressing     Problem: Safety - Adult  Goal: Free from fall injury  2024 0608 by Rosalina Arellano RN  Outcome: Progressing  2024 0608 by Rosalina Arellano RN  Outcome: Progressing     Problem: Postpartum  Goal: Experiences normal postpartum course  2024 0608 by Rosalina Arellano RN  Outcome: Progressing  2024 0608 by Rosalina Arellano RN  Outcome: Progressing  Goal: Appropriate maternal -  bonding  2024 0608 by Rosalina Arellano RN  Outcome: Progressing  2024 0608 by Rosalina Arellano RN  Outcome: Progressing  Goal: Establish and maintain infant feeding pattern for adequate nutrition  2024 0608 by Rosalina Arellano RN  Outcome: Progressing  2024 0608 by Rosalina Arellano RN  Outcome: Progressing  Goal: Incisions, wounds, or drain sites healing without S/S of infection  2024 0608 by Rosalina Arellano RN  Outcome: Progressing  2024 0608 by Rosalina Arellano RN  Outcome: Progressing  Goal: No s/sx infection  2024 0608 by Rosalina Arellano RN  Outcome: Progressing  2024 0608 by Rosalina Arellano RN  Outcome: Progressing  Goal: No s/sx of hemorrhage  2024 0608 by Rosalina Arellano RN  Outcome: Progressing  2024 0608 by Rosalina Arellano RN  Outcome: Progressing  Goal: Minimal s/sx of HDP and BP<160/110  2024 0608 by Rosalina Minkin, RN  Outcome: Progressing  2024 0608 by Rosalina Arellano RN  Outcome: Progressing   The patient's goals for the shift include rest, pain  control    The clinical goals for the shift include Vitals WNL, adequate pain control

## 2024-08-11 NOTE — DISCHARGE SUMMARY
Discharge Summary    Admission Date: 8/8/2024  Discharge Date: 08/11/24    Discharge Diagnosis  Normal vaginal delivery (HHS-HCC)    Hospital Course  Delivery Date: 8/9/2024 4:14 AM  Delivery type: Vaginal, Spontaneous   GA at delivery: 37w4d  Outcome: Living  Anesthesia during delivery: Epidural  Intrapartum complications: None  Feeding method: Breastfeeding Status: No     Procedures:  blood patch  Contraception at discharge: Depo Provera, with plans for interval IUD    Pertinent Physical Exam At Time of Discharge  Patient reports resolution of HA following blood patch procedure. Patient doing well overall, denies concerns today. No acute events overnight. Ambulating and urinating without difficulty. Denies chest pain, shortness of breath, leg pain or swelling, headaches, vision changes, heavy vaginal bleeding, abdominal pain, dizziness, fatigue.    General: Examination reveals a well developed, well nourished, female, in no acute distress. She is alert and cooperative.  Lungs: normal respiratory effort.  Fundus: firm, below umbilicus, and nontender.  Extremities: no redness or tenderness in the calves or thighs, no edema.  Psychological: awake and alert; oriented to person, place, and time.    Last Vitals:  Temp Pulse Resp BP MAP Pulse Ox   36.8 °C (98.2 °F) 70 17 98/65 76 94 %     Discharge Meds     Your medication list        START taking these medications        Instructions Last Dose Given Next Dose Due   acetaminophen 500 mg tablet  Commonly known as: Tylenol      Take 2 tablets (1,000 mg) by mouth every 6 hours if needed for mild pain (1 - 3).       ibuprofen 600 mg tablet      Take 1 tablet (600 mg) by mouth every 6 hours if needed for mild pain (1 - 3).       medroxyPROGESTERone 150 mg/mL injection  Commonly known as: Depo-Provera                  CONTINUE taking these medications        Instructions Last Dose Given Next Dose Due   Prenatal Vitamin Plus Low Iron 27 mg iron- 1 mg tablet  Generic drug:  prenatal vitamin calcium-iron-folic      Take 1 tablet by mouth once daily.              STOP taking these medications      aspirin 81 mg chewable tablet                  Where to Get Your Medications        These medications were sent to [facility]  [address], [address], [facility] [address]      Hours: 8AM to 6PM Mon-Fri, 8AM to 4PM Sat, 9AM to 1PM Sun Phone: [phone]   acetaminophen 500 mg tablet  ibuprofen 600 mg tablet       Complications Requiring Follow-Up  None    Test Results Pending At Discharge  Pending Labs       Order Current Status    Surgical Pathology Exam - PLACENTA In process          Outpatient Follow-Up  No future appointments. Advised to follow up with prenatal CNM in 4-6 weeks.    I spent >30 minutes in the professional and overall care of this patient.    Tia Driscoll, APRN-NAWAFM, APRN-CNP

## 2024-08-11 NOTE — CARE PLAN
The patient's goals for the shift include rest, pain control    The clinical goals for the shift include Vitals WNL, adequate pain control    Over the shift, the patient did not make progress toward the following goals.

## 2024-08-15 ENCOUNTER — APPOINTMENT (OUTPATIENT)
Dept: OBSTETRICS AND GYNECOLOGY | Facility: CLINIC | Age: 34
End: 2024-08-15
Payer: COMMERCIAL

## 2024-08-15 LAB
LABORATORY COMMENT REPORT: NORMAL
PATH REPORT.FINAL DX SPEC: NORMAL
PATH REPORT.GROSS SPEC: NORMAL
PATH REPORT.RELEVANT HX SPEC: NORMAL
PATH REPORT.TOTAL CANCER: NORMAL

## 2024-08-29 ENCOUNTER — APPOINTMENT (OUTPATIENT)
Dept: OBSTETRICS AND GYNECOLOGY | Facility: CLINIC | Age: 34
End: 2024-08-29
Payer: COMMERCIAL

## 2025-02-03 ENCOUNTER — APPOINTMENT (OUTPATIENT)
Dept: OBSTETRICS AND GYNECOLOGY | Facility: CLINIC | Age: 35
End: 2025-02-03
Payer: COMMERCIAL

## 2025-02-04 ENCOUNTER — PROCEDURE VISIT (OUTPATIENT)
Dept: OBSTETRICS AND GYNECOLOGY | Facility: CLINIC | Age: 35
End: 2025-02-04
Payer: COMMERCIAL

## 2025-02-04 VITALS
BODY MASS INDEX: 35.51 KG/M2 | DIASTOLIC BLOOD PRESSURE: 73 MMHG | SYSTOLIC BLOOD PRESSURE: 108 MMHG | HEART RATE: 80 BPM | WEIGHT: 220 LBS

## 2025-02-04 DIAGNOSIS — Z12.4 CERVICAL CANCER SCREENING: ICD-10-CM

## 2025-02-04 DIAGNOSIS — Z12.4 ROUTINE PAPANICOLAOU SMEAR: Primary | ICD-10-CM

## 2025-02-04 DIAGNOSIS — Z32.01 PREGNANCY TEST POSITIVE (HHS-HCC): ICD-10-CM

## 2025-02-04 DIAGNOSIS — Z30.430 ENCOUNTER FOR INTRAUTERINE DEVICE PLACEMENT: ICD-10-CM

## 2025-02-04 DIAGNOSIS — Z11.3 ROUTINE SCREENING FOR STI (SEXUALLY TRANSMITTED INFECTION): ICD-10-CM

## 2025-02-04 LAB — PREGNANCY TEST URINE, POC: POSITIVE

## 2025-02-04 PROCEDURE — 87661 TRICHOMONAS VAGINALIS AMPLIF: CPT

## 2025-02-04 PROCEDURE — 81025 URINE PREGNANCY TEST: CPT | Mod: GC

## 2025-02-04 PROCEDURE — 99213 OFFICE O/P EST LOW 20 MIN: CPT | Mod: GC

## 2025-02-04 PROCEDURE — 87491 CHLMYD TRACH DNA AMP PROBE: CPT

## 2025-02-04 PROCEDURE — 99213 OFFICE O/P EST LOW 20 MIN: CPT

## 2025-02-04 RX ORDER — ACETAMINOPHEN 500 MG
1000 TABLET ORAL ONCE
Status: DISCONTINUED | OUTPATIENT
Start: 2025-02-04 | End: 2025-02-04

## 2025-02-04 RX ORDER — IBUPROFEN 600 MG/1
600 TABLET ORAL ONCE
Status: DISCONTINUED | OUTPATIENT
Start: 2025-02-04 | End: 2025-02-04

## 2025-02-04 ASSESSMENT — ENCOUNTER SYMPTOMS
NEUROLOGICAL NEGATIVE: 0
ENDOCRINE NEGATIVE: 0
EYES NEGATIVE: 0
CARDIOVASCULAR NEGATIVE: 0
PSYCHIATRIC NEGATIVE: 0
ALLERGIC/IMMUNOLOGIC NEGATIVE: 0
MUSCULOSKELETAL NEGATIVE: 0
RESPIRATORY NEGATIVE: 0
GASTROINTESTINAL NEGATIVE: 0
CONSTITUTIONAL NEGATIVE: 0
HEMATOLOGIC/LYMPHATIC NEGATIVE: 0

## 2025-02-04 ASSESSMENT — PAIN SCALES - GENERAL: PAINLEVEL_OUTOF10: 0-NO PAIN

## 2025-02-04 NOTE — PROGRESS NOTES
GYN VISIT  2025          SUBJECTIVE    HPI: Parvez Russell is a 34 y.o.  presenting for IUD insertion. Due for pap. Also desires STI testing    Past medical history, surgical history, medications, allergies, family history, social history updated.    OBJECTIVE  Visit Vitals  /73   Pulse 80   Wt 99.8 kg (220 lb)   LMP 2024   Breastfeeding No   BMI 35.51 kg/m²   OB Status Recent pregnancy   Smoking Status Former   BSA 2.16 m²        Physical exam:  Gen: No acute distress  Abd: soft, nontender, nondistended  : normal appearing external genitalia. Normal appearing vagina with scant white discharge. Cervix normal. Bimanual examination nontender, small, mobile uterus, ovaries nonpalpable. Pap collected today   Neuro: awake and conversant  Psych: appropriate mood and affect      ASSESSMENT & PLAN    Parvez Russell is a 34 y.o.  presenting for IUD insertion, however, had positive pregnancy test. The following concerns we addressed today:    Positive pregnancy test  -patient does not desire pregnancy at this time  -pregnancy resources provided and options discussed    Routine cervical caner screening  -last pap NILM 2019  -pap/co test collected today     Routine STI screening  -GC/CT/trich off pap  -Blood STI panel ordered      Orders Placed This Encounter   Procedures    HIV 1/2 Antigen/Antibody Screen with Reflex to Confirmation     Standing Status:   Future     Number of Occurrences:   1     Standing Expiration Date:   2026     Order Specific Question:   [facility]     Answer:   Immediate [1]    Syphilis Screen with Reflex     Standing Status:   Future     Number of Occurrences:   1     Standing Expiration Date:   2026     Order Specific Question:   Release result to [facility]     Answer:   Immediate [1]    Hepatitis C Antibody     Standing Status:   Future     Number of Occurrences:   1     Standing Expiration Date:   2026     Order Specific  Question:   Release result to [employer]     Answer:   Immediate [1]    Hepatitis B Surface Antigen     Standing Status:   Future     Number of Occurrences:   1     Standing Expiration Date:   2/4/2026     Order Specific Question:   Release result to [facility]     Answer:   Immediate [1]    POCT pregnancy, urine manually resulted     Order Specific Question:   Release result to [employer]     Answer:   Immediate [1]        RTC as need for IUD insertion when desired    Patient seen and evaluated with Dr. Ariana Driver MD

## 2025-02-06 LAB
C TRACH RRNA SPEC QL NAA+PROBE: NEGATIVE
N GONORRHOEA DNA SPEC QL PROBE+SIG AMP: NEGATIVE
T VAGINALIS RRNA SPEC QL NAA+PROBE: NEGATIVE

## 2025-02-18 LAB
CYTOLOGY CMNT CVX/VAG CYTO-IMP: NORMAL
HPV HR 12 DNA GENITAL QL NAA+PROBE: NEGATIVE
HPV HR GENOTYPES PNL CVX NAA+PROBE: NEGATIVE
HPV16 DNA SPEC QL NAA+PROBE: NEGATIVE
HPV18 DNA SPEC QL NAA+PROBE: NEGATIVE
LAB AP HPV GENOTYPE QUESTION: YES
LAB AP HPV HR: NORMAL
LAB AP PAP ADDITIONAL TESTS: NORMAL
LABORATORY COMMENT REPORT: NORMAL
LMP START DATE: NORMAL
PATH REPORT.TOTAL CANCER: NORMAL

## 2025-02-19 ENCOUNTER — HOSPITAL ENCOUNTER (EMERGENCY)
Facility: HOSPITAL | Age: 35
Discharge: LEFT BEFORE DISCHARGE | End: 2025-02-19
Payer: COMMERCIAL

## 2025-02-19 PROCEDURE — 4500999001 HC ED NO CHARGE

## 2025-08-22 ENCOUNTER — APPOINTMENT (OUTPATIENT)
Dept: OBSTETRICS AND GYNECOLOGY | Facility: CLINIC | Age: 35
End: 2025-08-22
Payer: COMMERCIAL

## 2025-09-05 ENCOUNTER — APPOINTMENT (OUTPATIENT)
Dept: OBSTETRICS AND GYNECOLOGY | Facility: CLINIC | Age: 35
End: 2025-09-05
Payer: COMMERCIAL